# Patient Record
Sex: FEMALE | Race: WHITE | URBAN - METROPOLITAN AREA
[De-identification: names, ages, dates, MRNs, and addresses within clinical notes are randomized per-mention and may not be internally consistent; named-entity substitution may affect disease eponyms.]

---

## 2017-02-21 ENCOUNTER — OP HISTORICAL/CONVERTED ENCOUNTER (OUTPATIENT)
Dept: OTHER | Age: 82
End: 2017-02-21

## 2017-03-17 ENCOUNTER — IP HISTORICAL/CONVERTED ENCOUNTER (OUTPATIENT)
Dept: OTHER | Age: 82
End: 2017-03-17

## 2017-04-05 ENCOUNTER — OP HISTORICAL/CONVERTED ENCOUNTER (OUTPATIENT)
Dept: OTHER | Age: 82
End: 2017-04-05

## 2017-07-28 ENCOUNTER — OFFICE VISIT (OUTPATIENT)
Dept: ENDOCRINOLOGY | Age: 82
End: 2017-07-28

## 2017-07-28 VITALS
OXYGEN SATURATION: 99 % | RESPIRATION RATE: 16 BRPM | WEIGHT: 139.3 LBS | DIASTOLIC BLOOD PRESSURE: 54 MMHG | TEMPERATURE: 95.9 F | SYSTOLIC BLOOD PRESSURE: 152 MMHG | HEIGHT: 64 IN | HEART RATE: 75 BPM | BODY MASS INDEX: 23.78 KG/M2

## 2017-07-28 DIAGNOSIS — E03.4 HYPOTHYROIDISM DUE TO ACQUIRED ATROPHY OF THYROID: ICD-10-CM

## 2017-07-28 DIAGNOSIS — E11.65 TYPE 2 DIABETES MELLITUS WITH HYPERGLYCEMIA, WITH LONG-TERM CURRENT USE OF INSULIN (HCC): Primary | ICD-10-CM

## 2017-07-28 DIAGNOSIS — Z79.4 TYPE 2 DIABETES MELLITUS WITH HYPERGLYCEMIA, WITH LONG-TERM CURRENT USE OF INSULIN (HCC): Primary | ICD-10-CM

## 2017-07-28 DIAGNOSIS — I10 ESSENTIAL HYPERTENSION: ICD-10-CM

## 2017-07-28 RX ORDER — HYDROCHLOROTHIAZIDE 25 MG/1
25 TABLET ORAL DAILY
COMMUNITY
End: 2019-01-01

## 2017-07-28 RX ORDER — SIMVASTATIN 20 MG/1
TABLET, FILM COATED ORAL
COMMUNITY
End: 2019-01-01

## 2017-07-28 RX ORDER — MEMANTINE HYDROCHLORIDE 10 MG/1
TABLET ORAL 2 TIMES DAILY
COMMUNITY
End: 2019-01-01

## 2017-07-28 RX ORDER — SERTRALINE HYDROCHLORIDE 50 MG/1
TABLET, FILM COATED ORAL 2 TIMES DAILY
COMMUNITY

## 2017-07-28 RX ORDER — METFORMIN HYDROCHLORIDE 1000 MG/1
1000 TABLET ORAL 2 TIMES DAILY WITH MEALS
COMMUNITY
End: 2018-09-13

## 2017-07-28 RX ORDER — LEVOTHYROXINE SODIUM 50 UG/1
TABLET ORAL
COMMUNITY

## 2017-07-28 RX ORDER — ALPRAZOLAM 0.25 MG/1
TABLET ORAL
COMMUNITY
End: 2019-01-01

## 2017-07-28 RX ORDER — LISINOPRIL 10 MG/1
TABLET ORAL 2 TIMES DAILY
COMMUNITY

## 2017-07-28 RX ORDER — GLIPIZIDE 10 MG/1
10 TABLET ORAL 2 TIMES DAILY
COMMUNITY
End: 2017-10-30 | Stop reason: ALTCHOICE

## 2017-07-28 NOTE — PROGRESS NOTES
[facility]               Stasia Merlin ,MD        [address] 78 [phone] Fax [phone]               Patient Information  Date:7/30/2017  Name : Stephanie Taylor 80 y.o.     YOB: 1933         Referred by: Richard Graves NP        Chief Complaint   Patient presents with    New Patient     Diabetes       History of Present Illness: Stephanie Taylor is a 80 y.o. female here for initial visit of  Type 2 Diabetes Mellitus. She is here for management of type 2 diabetes mellitus. She was diagnosed with diabetes when she was in her 46s. She is on Glipizide, Levemir, Metformin. She lives alone, eats sometimes only one meal a day and munches all day. As far as oral medications, her family is helping her both in the morning and the evening. One of the family members come to make sure she takes her medications and checks blood glucose. She also checks the blood glucose intermittently. She is checking it sometimes before and sometimes right after the meals. No significant hypoglycemic episodes. She does say that she sometimes feels the blood sugar drops too quickly and she has to eat something. Weight has been stable. Wt Readings from Last 3 Encounters:   07/28/17 139 lb 4.8 oz (63.2 kg)       BP Readings from Last 3 Encounters:   07/28/17 152/54           Past Medical History:   Diagnosis Date    Alzheimer disease     Anxiety     COPD (chronic obstructive pulmonary disease) (HCC)     Depressive disorder     DM (diabetes mellitus) ([facility]     HTN (hypertension)     Hyperlipemia      Current Outpatient Prescriptions   Medication Sig    metFORMIN (GLUCOPHAGE) 1,000 mg tablet Take 1,000 mg by mouth two (2) times daily (with meals).  glipiZIDE (GLUCOTROL) 10 mg tablet Take 10 mg by mouth two (2) times a day.  levothyroxine (SYNTHROID) 50 mcg tablet Take  by mouth Daily (before breakfast).     sertraline (ZOLOFT) 50 mg tablet Take  by mouth two (2) times a day.  lisinopril (PRINIVIL, ZESTRIL) 10 mg tablet Take  by mouth two (2) times a day.  hydroCHLOROthiazide (HYDRODIURIL) 25 mg tablet Take 25 mg by mouth daily. As needed    memantine (NAMENDA) 10 mg tablet Take  by mouth two (2) times a day.  simvastatin (ZOCOR) 20 mg tablet Take  by mouth nightly.  ALPRAZolam (XANAX) 0.25 mg tablet Take  by mouth.  insulin detemir (LEVEMIR FLEXTOUCH) 100 unit/mL (3 mL) inpn Inject 14 units in the AM and 10 units at night. No current facility-administered medications for this visit. Allergies   Allergen Reactions    Aspirin Unknown (comments)    Codeine Unknown (comments)    Demerol [Meperidine] Unknown (comments)    Duricef [Cefadroxil] Unknown (comments)    Erythromycin Unknown (comments)    Lyrica [Pregabalin] Unknown (comments)    Morphine Unknown (comments)    Neurontin [Gabapentin] Rash and Swelling    Pcn [Penicillins] Rash and Swelling    Sulfa (Sulfonamide Antibiotics) Rash    Tetracyclines Rash    Toradol [Ketorolac] Unknown (comments)    Tramadol Unknown (comments)         Review of Systems:  - Constitutional Symptoms: no fevers, no chills, no weight loss  - Eyes: no blurry vision no double vision  - Cardiovascular: no chest pain ,no palpitations  - Respiratory: no cough no shortness of breath  - Gastrointestinal: no dysphagia no  abdominal pain  - Musculoskeletal: + joint pains  +  weakness  - Integumentary: no rashes  - Neurological: + numbness, tingling, no  headaches  - Psychiatric: no depression no  anxiety  - Endocrine: no heat or cold intolerance    Physical Examination:   Blood pressure 152/54, pulse 75, temperature 95.9 °F (35.5 °C), temperature source Oral, resp. rate 16, height 5' 4\" (1.626 m), weight 139 lb 4.8 oz (63.2 kg), SpO2 99 %. Estimated body mass index is 23.91 kg/(m^2) as calculated from the following:    Height as of this encounter: 5' 4\" (1.626 m).   -   Weight as of this encounter: 139 lb 4.8 oz (63.2 kg). - General: pleasant, no distress, good eye contact  - HEENT: no pallor, no periorbital edema, EOMI  - Neck: supple, no thyromegaly,   - Cardiovascular: regular, normal rate, normal S1 and S2,   - Respiratory: clear to auscultation bilaterally  - Gastrointestinal: soft, nontender, nondistended,  BS +  - Musculoskeletal: no proximal muscle weakness in upper or lower extremities  - Integumentary: no edema,  - Neurological:,alert and oriented  - Psychiatric: normal mood and affect  - Skin: color, texture, turgor normal.       Data Reviewed:     [] Glucose records reviewed. [] See glucose records for details (to be scanned). [] A1C  [] Reviewed labs      Assessment/Plan:     1. Type 2 diabetes mellitus with hyperglycemia, with long-term current use of insulin ([address]    2. Essential hypertension    3. Hypothyroidism due to acquired atrophy of thyroid        1. Type 2 Diabetes Mellitus with nephropathy,  No results found for: HBA1C, HGBE8, [id number], LWJ2YHRM, IKR9UKCA    Stop Glipizide  Check sugars before breakfast and before bedtime  Continue Metformin , will stop Metformin if     Levemir 14 units in AM and 10 units at night . If sugars are less than 100 at bedtime then do not take Levemir       2. HTN : Continue current therapy     3. Hyperlipidemia : Continue statin. 4. Hypothyroidism - on T4        Follow-up Disposition:  Return in about 3 months (around 10/28/2017). Thank you for allowing me to participate in the care of this patient.     Mauricio Westbrook MD      Patient verbalized understanding

## 2017-07-28 NOTE — MR AVS SNAPSHOT
Visit Information Date & Time Provider Department Dept. Phone Encounter #  
 7/28/2017 10:00 AM Soham Winkler MD [facility] [phone] [id number] Follow-up Instructions Return in about 3 months (around 10/28/2017). Upcoming Health Maintenance Date Due DTaP/Tdap/Td series (1 - Tdap) 1/24/1954 ZOSTER VACCINE AGE 60> 11/24/1992 GLAUCOMA SCREENING Q2Y 1/24/1998 OSTEOPOROSIS SCREENING (DEXA) 1/24/1998 Pneumococcal 65+ Low/Medium Risk (1 of 2 - PCV13) 1/24/1998 MEDICARE YEARLY EXAM 1/24/1998 INFLUENZA AGE 9 TO ADULT 8/1/2017 Allergies as of 7/28/2017  Review Complete On: 7/28/2017 By: Soham Winkler MD  
  
 Severity Noted Reaction Type Reactions Aspirin  07/28/2017    Unknown (comments) Codeine  07/28/2017    Unknown (comments) Demerol [Meperidine]  07/28/2017    Unknown (comments) Duricef [Cefadroxil]  07/28/2017    Unknown (comments) Erythromycin  07/28/2017    Unknown (comments) Lyrica [Pregabalin]  07/28/2017    Unknown (comments) Morphine  07/28/2017    Unknown (comments) Neurontin [Gabapentin]  07/28/2017    Rash, Swelling Pcn [Penicillins]  07/28/2017    Rash, Swelling Sulfa (Sulfonamide Antibiotics)  07/28/2017    Rash Tetracyclines  07/28/2017    Rash Toradol [Ketorolac]  07/28/2017    Unknown (comments) Tramadol  07/28/2017    Unknown (comments) Current Immunizations  Never Reviewed No immunizations on file. Not reviewed this visit Vitals BP Pulse Temp Resp Height(growth percentile) Weight(growth percentile) 152/54 (BP 1 Location: Left arm, BP Patient Position: Sitting) 75 95.9 °F (35.5 °C) (Oral) 16 5' 4\" (1.626 m) 139 lb 4.8 oz (63.2 kg) SpO2 BMI OB Status Smoking Status 99% 23.91 kg/m2 Hysterectomy Former Smoker Vitals History BMI and BSA Data  Body Mass Index Body Surface Area  
 23.91 kg/m 2 1.69 m 2  
  
  
 Your Updated Medication List  
  
   
This list is accurate as of: 7/28/17 10:43 AM.  Always use your most recent med list.  
  
  
  
  
 ALPRAZolam 0.25 mg tablet Commonly known as:  Fernando Darek Take  by mouth. glipiZIDE 10 mg tablet Commonly known as:  [place] Take 10 mg by mouth two (2) times a day. hydroCHLOROthiazide 25 mg tablet Commonly known as:  HYDRODIURIL Take 25 mg by mouth daily. As needed LEVEMIR FLEXTOUCH SC  
20 Units by SubCUTAneous route nightly. levothyroxine 50 mcg tablet Commonly known as:  SYNTHROID Take  by mouth Daily (before breakfast). lisinopril 10 mg tablet Commonly known as:  [facility] [place] Take  by mouth two (2) times a day. memantine 10 mg tablet Commonly known as:  [facility] [employer] Take  by mouth two (2) times a day. metFORMIN 1,000 mg tablet Commonly known as:  GLUCOPHAGE Take 1,000 mg by mouth two (2) times daily (with meals). sertraline 50 mg tablet Commonly known as:  ZOLOFT Take  by mouth two (2) times a day. simvastatin 20 mg tablet Commonly known as:  ZOCOR Take  by mouth nightly. Follow-up Instructions Return in about 3 months (around 10/28/2017). Patient Instructions Stop Glipizide Check sugars before breakfast and before bedtime Continue Metformin Levemir 14 units in AM and 10 units at night If sugars are less than 100 at bedtime then do not take Levemir Preventing Falls: Care Instructions Your Care Instructions Getting around your home safely can be a challenge if you have injuries or health problems that make it easy for you to fall. Loose rugs and furniture in walkways are among the dangers for many older people who have problems walking or who have poor eyesight. People who have conditions such as arthritis, osteoporosis, or dementia also have to be careful not to fall. You can make your home safer with a few simple measures. Follow-up care is a key part of your treatment and safety. Be sure to make and go to all appointments, and call your doctor if you are having problems. It's also a good idea to know your test results and keep a list of the medicines you take. How can you care for yourself at home? Taking care of yourself · You may get dizzy if you do not drink enough water. To prevent dehydration, drink plenty of fluids, enough so that your urine is light yellow or clear like water. Choose water and other caffeine-free clear liquids. If you have kidney, heart, or liver disease and have to limit fluids, talk with your doctor before you increase the amount of fluids you drink. · Exercise regularly to improve your strength, muscle tone, and balance. Walk if you can. Swimming may be a good choice if you cannot walk easily. · Have your vision and hearing checked each year or any time you notice a change. If you have trouble seeing and hearing, you might not be able to avoid objects and could lose your balance. · Know the side effects of the medicines you take. Ask your doctor or pharmacist whether the medicines you take can affect your balance. Sleeping pills or sedatives can affect your balance. · Limit the amount of alcohol you drink. Alcohol can impair your balance and other senses. · Ask your doctor whether calluses or corns on your feet need to be removed. If you wear loose-fitting shoes because of calluses or corns, you can lose your balance and fall. · Talk to your doctor if you have numbness in your feet. Preventing falls at home · Remove raised doorway thresholds, throw rugs, and clutter. Repair loose carpet or raised areas in the floor. · Move furniture and electrical cords to keep them out of walking paths. · Use nonskid floor wax, and wipe up spills right away, especially on ceramic tile floors. · If you use a walker or cane, put rubber tips on it.  If you use crutches, clean the bottoms of them regularly with an abrasive pad, such as steel wool. · Keep your house well lit, especially [facility], and outside walkways. Use night-lights in areas such as hallways and bathrooms. Add extra light switches or use remote switches (such as switches that go on or off when you clap your hands) to make it easier to turn lights on if you have to get up during the night. · Install sturdy handrails on stairways. · Move items in your cabinets so that the things you use a lot are on the lower shelves (about waist level). · Keep a cordless phone and a flashlight with new batteries by your bed. If possible, put a phone in each of the main rooms of your house, or carry a cell phone in case you fall and cannot reach a phone. Or, you can wear a device around your neck or wrist. You push a button that sends a signal for help. · Wear low-heeled shoes that fit well and give your feet good support. Use footwear with nonskid soles. Check the heels and soles of your shoes for wear. Repair or replace worn heels or soles. · Do not wear socks without shoes on wood floors. · Walk on the grass when the sidewalks are slippery. If you live in an area that gets snow and ice in the winter, sprinkle salt on slippery steps and sidewalks. Preventing falls in the bath · Install grab bars and nonskid mats inside and outside your shower or tub and near the toilet and sinks. · Use shower chairs and bath benches. · Use a hand-held shower head that will allow you to sit while showering. · Get into a tub or shower by putting the weaker leg in first. Get out of a tub or shower with your strong side first. 
· Repair loose toilet seats and consider installing a raised toilet seat to make getting on and off the toilet easier. · Keep your bathroom door unlocked while you are in the shower. Where can you learn more? Go to http://trevor-nisreen.info/. Enter 0476 [id number] in the search box to learn more about \"Preventing Falls: Care Instructions. \" Current as of: August 4, 2016 Content Version: 11.3 © 1219-7754 [employer], Broadcast.com. Care instructions adapted under license by [employer] (which disclaims liability or warranty for this information). If you have questions about a medical condition or this instruction, always ask your healthcare professional. [place] [address] any warranty or liability for your use of this information. Introducing [facility] & HE[facility]! Chantal De Anda introduces FriendsClear patient portal. Now you can access parts of your medical record, email your doctor's office, and request medication refills online. 1. In your internet browser, go to [email or web address] 2. Click on the First Time User? Click Here link in the Sign In box. You will see the New Member Sign Up page. 3. Enter your FriendsClear Access Code exactly as it appears below. You will not need to use this code after youve completed the sign-up process. If you do not sign up before the expiration date, you must request a new code. · FriendsClear Access Code: [email or web address] Expires: 10/26/2017 10:43 AM 
 
4. Enter the last four digits of your Social Security Number (xxxx) and Date of Birth (mm/dd/yyyy) as indicated and click Submit. You will be taken to the next sign-up page. 5. Create a FriendsClear ID. This will be your FriendsClear login ID and cannot be changed, so think of one that is secure and easy to remember. 6. Create a FriendsClear password. You can change your password at any time. 7. Enter your Password Reset Question and Answer. This can be used at a later time if you forget your password. 8. Enter your e-mail address. You will receive e-mail notification when new information is available in [address] 9. Click Sign Up. You can now view and download portions of your medical record. 10. Click the Download Summary menu link to download a portable copy of your medical information. If you have questions, please visit the Frequently Asked Questions section of the [employer] website. Remember, [employer] is NOT to be used for urgent needs. For medical emergencies, dial 911. Now available from your iPhone and Android! Please provide this summary of care documentation to your next provider. If you have any questions after today's visit, please call [phone].

## 2017-07-28 NOTE — PATIENT INSTRUCTIONS
Stop Glipizide    Check sugars before breakfast and before bedtime    Continue Metformin     Levemir 14 units in AM and 10 units at night     If sugars are less than 100 at bedtime then do not take Levemir          Preventing Falls: Care Instructions  Your Care Instructions  Getting around your home safely can be a challenge if you have injuries or health problems that make it easy for you to fall. Loose rugs and furniture in walkways are among the dangers for many older people who have problems walking or who have poor eyesight. People who have conditions such as arthritis, osteoporosis, or dementia also have to be careful not to fall. You can make your home safer with a few simple measures. Follow-up care is a key part of your treatment and safety. Be sure to make and go to all appointments, and call your doctor if you are having problems. It's also a good idea to know your test results and keep a list of the medicines you take. How can you care for yourself at home? Taking care of yourself  · You may get dizzy if you do not drink enough water. To prevent dehydration, drink plenty of fluids, enough so that your urine is light yellow or clear like water. Choose water and other caffeine-free clear liquids. If you have kidney, heart, or liver disease and have to limit fluids, talk with your doctor before you increase the amount of fluids you drink. · Exercise regularly to improve your strength, muscle tone, and balance. Walk if you can. Swimming may be a good choice if you cannot walk easily. · Have your vision and hearing checked each year or any time you notice a change. If you have trouble seeing and hearing, you might not be able to avoid objects and could lose your balance. · Know the side effects of the medicines you take. Ask your doctor or pharmacist whether the medicines you take can affect your balance. Sleeping pills or sedatives can affect your balance. · Limit the amount of alcohol you drink. Alcohol can impair your balance and other senses. · Ask your doctor whether calluses or corns on your feet need to be removed. If you wear loose-fitting shoes because of calluses or corns, you can lose your balance and fall. · Talk to your doctor if you have numbness in your feet. Preventing falls at home  · Remove raised doorway thresholds, throw rugs, and clutter. Repair loose carpet or raised areas in the floor. · Move furniture and electrical cords to keep them out of walking paths. · Use nonskid floor wax, and wipe up spills right away, especially on ceramic tile floors. · If you use a walker or cane, put rubber tips on it. If you use crutches, clean the bottoms of them regularly with an abrasive pad, such as steel wool. · Keep your house well lit, especially [facility], and outside walkways. Use night-lights in areas such as hallways and bathrooms. Add extra light switches or use remote switches (such as switches that go on or off when you clap your hands) to make it easier to turn lights on if you have to get up during the night. · Install sturdy handrails on stairways. · Move items in your cabinets so that the things you use a lot are on the lower shelves (about waist level). · Keep a cordless phone and a flashlight with new batteries by your bed. If possible, put a phone in each of the main rooms of your house, or carry a cell phone in case you fall and cannot reach a phone. Or, you can wear a device around your neck or wrist. You push a button that sends a signal for help. · Wear low-heeled shoes that fit well and give your feet good support. Use footwear with nonskid soles. Check the heels and soles of your shoes for wear. Repair or replace worn heels or soles. · Do not wear socks without shoes on wood floors. · Walk on the grass when the sidewalks are slippery. If you live in an area that gets snow and ice in the winter, sprinkle salt on slippery steps and sidewalks.   Preventing falls in the bath  · Install grab bars and nonskid mats inside and outside your shower or tub and near the toilet and sinks. · Use shower chairs and bath benches. · Use a hand-held shower head that will allow you to sit while showering. · Get into a tub or shower by putting the weaker leg in first. Get out of a tub or shower with your strong side first.  · Repair loose toilet seats and consider installing a raised toilet seat to make getting on and off the toilet easier. · Keep your bathroom door unlocked while you are in the shower. Where can you learn more? Go to http://trevor-nisreen.info/. Enter [id number] in the search box to learn more about \"Preventing Falls: Care Instructions. \"  Current as of: August 4, 2016  Content Version: 11.3  © 8272-7565 [employer]oding [employer], Cont[employer]nt.com. Care instructions adapted under license by [employer] (which disclaims liability or warranty for this information). If you have questions about a medical condition or this instruction, always ask your healthcare professional. [place] [address] any warranty or liability for your use of this information.

## 2017-07-28 NOTE — PROGRESS NOTES
Anish Enamorado is a 80 y.o. female here for   Chief Complaint   Patient presents with    New Patient     Diabetes       Functional glucose monitor and record keeping system? - yes  Eye exam within last year? - yes  Foot exam within last year? - yes    1. Have you been to the ER, urgent care clinic since your last visit? Hospitalized since your last visit? - [facility] for dehydration in March 2017    2. Have you seen or consulted any other health care providers outside of the [address] [place] [address] since your last visit?   Include any pap smears or colon screening.-      No results found for: HBA1C, HGBE8, [id number], [id number]    Wt Readings from Last 3 Encounters:   07/28/17 139 lb 4.8 oz (63.2 kg)     Temp Readings from Last 3 Encounters:   07/28/17 95.9 °F (35.5 °C) (Oral)     BP Readings from Last 3 Encounters:   07/28/17 152/54     Pulse Readings from Last 3 Encounters:   07/28/17 75

## 2017-07-30 PROBLEM — I10 ESSENTIAL HYPERTENSION: Status: ACTIVE | Noted: 2017-07-30

## 2017-07-30 PROBLEM — E03.4 HYPOTHYROIDISM DUE TO ACQUIRED ATROPHY OF THYROID: Status: ACTIVE | Noted: 2017-07-30

## 2017-07-30 PROBLEM — Z79.4 TYPE 2 DIABETES MELLITUS WITH HYPERGLYCEMIA, WITH LONG-TERM CURRENT USE OF INSULIN (HCC): Status: ACTIVE | Noted: 2017-07-30

## 2017-07-30 PROBLEM — E11.65 TYPE 2 DIABETES MELLITUS WITH HYPERGLYCEMIA, WITH LONG-TERM CURRENT USE OF INSULIN (HCC): Status: ACTIVE | Noted: 2017-07-30

## 2017-10-03 ENCOUNTER — OP HISTORICAL/CONVERTED ENCOUNTER (OUTPATIENT)
Dept: OTHER | Age: 82
End: 2017-10-03

## 2017-10-30 ENCOUNTER — OFFICE VISIT (OUTPATIENT)
Dept: ENDOCRINOLOGY | Age: 82
End: 2017-10-30

## 2017-10-30 VITALS
RESPIRATION RATE: 18 BRPM | HEART RATE: 69 BPM | BODY MASS INDEX: 21.51 KG/M2 | SYSTOLIC BLOOD PRESSURE: 132 MMHG | WEIGHT: 126 LBS | HEIGHT: 64 IN | DIASTOLIC BLOOD PRESSURE: 57 MMHG | TEMPERATURE: 96.3 F | OXYGEN SATURATION: 98 %

## 2017-10-30 DIAGNOSIS — E11.65 TYPE 2 DIABETES MELLITUS WITH HYPERGLYCEMIA, WITH LONG-TERM CURRENT USE OF INSULIN (HCC): ICD-10-CM

## 2017-10-30 DIAGNOSIS — I10 ESSENTIAL HYPERTENSION: ICD-10-CM

## 2017-10-30 DIAGNOSIS — E03.4 HYPOTHYROIDISM DUE TO ACQUIRED ATROPHY OF THYROID: ICD-10-CM

## 2017-10-30 DIAGNOSIS — Z79.4 TYPE 2 DIABETES MELLITUS WITH HYPERGLYCEMIA, WITH LONG-TERM CURRENT USE OF INSULIN (HCC): Primary | ICD-10-CM

## 2017-10-30 DIAGNOSIS — E11.65 TYPE 2 DIABETES MELLITUS WITH HYPERGLYCEMIA, WITH LONG-TERM CURRENT USE OF INSULIN (HCC): Primary | ICD-10-CM

## 2017-10-30 DIAGNOSIS — Z79.4 TYPE 2 DIABETES MELLITUS WITH HYPERGLYCEMIA, WITH LONG-TERM CURRENT USE OF INSULIN (HCC): ICD-10-CM

## 2017-10-30 LAB
GLUCOSE POC: 166 MG/DL
HBA1C MFR BLD HPLC: 6.9 %

## 2017-10-30 NOTE — PROGRESS NOTES
[facility]               Karina Rodriguez MD        [address] 78 444 81 [phone] Fax [phone]               Patient Information  Date:10/30/2017  Name : Roel Hess 80 y.o.     YOB: 1933         Referred by: Patti Bence, NP        Chief Complaint   Patient presents with    Diabetes    Thyroid Problem       History of Present Illness: Roel Hess is a 80 y.o. female here for fu of  Type 2 Diabetes Mellitus. She is here for management of type 2 diabetes mellitus. She was diagnosed with diabetes when she was in her 46s. She lives alone, eats sometimes only one meal a day and munches all day. As far as oral medications, her family is helping her both in the morning and the evening. One of the family members come to make sure she takes her medications and checks blood glucose. Reviewed the log - no hypos   Missing insulin and then BG is > 200     Lost weight           Wt Readings from Last 3 Encounters:   10/30/17 126 lb (57.2 kg)   07/28/17 139 lb 4.8 oz (63.2 kg)       BP Readings from Last 3 Encounters:   10/30/17 132/57   07/28/17 152/54           Past Medical History:   Diagnosis Date    Alzheimer disease     Anxiety     COPD (chronic obstructive pulmonary disease) (HCC)     Depressive disorder     DM (diabetes mellitus) ([facility]     HTN (hypertension)     Hyperlipemia      Current Outpatient Prescriptions   Medication Sig    metFORMIN (GLUCOPHAGE) 1,000 mg tablet Take 1,000 mg by mouth two (2) times daily (with meals).  glipiZIDE (GLUCOTROL) 10 mg tablet Take 10 mg by mouth two (2) times a day.  levothyroxine (SYNTHROID) 50 mcg tablet Take  by mouth Daily (before breakfast).  sertraline (ZOLOFT) 50 mg tablet Take  by mouth two (2) times a day.  lisinopril (PRINIVIL, ZESTRIL) 10 mg tablet Take  by mouth two (2) times a day.  hydroCHLOROthiazide (HYDRODIURIL) 25 mg tablet Take 25 mg by mouth daily.  As needed    memantine (NAMENDA) 10 mg tablet Take  by mouth two (2) times a day.  simvastatin (ZOCOR) 20 mg tablet Take  by mouth nightly.  ALPRAZolam (XANAX) 0.25 mg tablet Take  by mouth.  insulin detemir (LEVEMIR FLEXTOUCH) 100 unit/mL (3 mL) inpn Inject 14 units in the AM and 10 units at night. No current facility-administered medications for this visit. Allergies   Allergen Reactions    Aspirin Unknown (comments)    Codeine Unknown (comments)    Demerol [Meperidine] Unknown (comments)    Duricef [Cefadroxil] Unknown (comments)    Erythromycin Unknown (comments)    Lyrica [Pregabalin] Unknown (comments)    Morphine Unknown (comments)    Neurontin [Gabapentin] Rash and Swelling    Pcn [Penicillins] Rash and Swelling    Sulfa (Sulfonamide Antibiotics) Rash    Tetracyclines Rash    Toradol [Ketorolac] Unknown (comments)    Tramadol Unknown (comments)         Review of Systems:  - Constitutional Symptoms: no fevers, no chills,  - Eyes: no blurry vision no double vision  - Cardiovascular: no chest pain ,no palpitations  - Respiratory: no cough no shortness of breath  - Gastrointestinal: no dysphagia no  abdominal pain  - Musculoskeletal: + joint pains  +  weakness  - Integumentary: no rashes  - Neurological: + numbness, tingling, no  headaches  - Psychiatric: no depression no  anxiety  - Endocrine: no heat or cold intolerance    Physical Examination:   Blood pressure 132/57, pulse 69, temperature 96.3 °F (35.7 °C), temperature source Oral, resp. rate 18, height 5' 4\" (1.626 m), weight 126 lb (57.2 kg), SpO2 98 %. Estimated body mass index is 21.63 kg/(m^2) as calculated from the following:    Height as of this encounter: 5' 4\" (1.626 m). -   Weight as of this encounter: 126 lb (57.2 kg).   - General: pleasant, no distress, good eye contact  - HEENT: no pallor, no periorbital edema, EOMI  - Neck: supple, no thyromegaly,   - Cardiovascular: regular, normal rate, normal S1 and S2, - Respiratory: clear to auscultation bilaterally  - Gastrointestinal: soft, nontender, nondistended,  BS +  - Musculoskeletal: no proximal muscle weakness in upper or lower extremities  - Integumentary: no edema,  - Neurological:,alert and oriented  - Psychiatric: normal mood and affect  - Skin: color, texture, turgor normal.       Data Reviewed:     [] Glucose records reviewed. [] See glucose records for details (to be scanned). [] A1C  [] Reviewed labs      Assessment/Plan:     1. Type 2 diabetes mellitus with hyperglycemia, with long-term current use of insulin ([facility]    2. Hypothyroidism due to acquired atrophy of thyroid    3. Essential hypertension        1. Type 2 Diabetes Mellitus with nephropathy,  Lab Results   Component Value Date/Time    Hemoglobin A1c (POC) 6.9 10/30/2017 10:27 AM         Check sugars before breakfast and before bedtime  Continue Metformin , will stop Metformin if  GFR is < 30     Levemir 8 units in AM and 8 units at night to start and then increase to 10 unist BID  . If sugars are less than 100 at bedtime then do not take Levemir    Glucerna when not eating     2. HTN : Continue current therapy     3. Hyperlipidemia : Continue statin. 4. Hypothyroidism - on T4    Family member with patient     Follow-up Disposition: Not on File    Thank you for allowing me to participate in the care of this patient.     Yola Lockhart MD      Patient verbalized understanding

## 2017-10-30 NOTE — MR AVS SNAPSHOT
Visit Information Date & Time Provider Department Dept. Phone Encounter #  
 10/30/2017 10:00 AM Will Wallace MD [facility] [phone] [id number] Upcoming Health Maintenance Date Due HEMOGLOBIN A1C Q6M 1/24/1933 LIPID PANEL Q1 1/24/1933 FOOT EXAM Q1 1/24/1943 MICROALBUMIN Q1 1/24/1943 EYE EXAM RETINAL OR DILATED Q1 1/24/1943 DTaP/Tdap/Td series (1 - Tdap) 1/24/1954 ZOSTER VACCINE AGE 60> 11/24/1992 GLAUCOMA SCREENING Q2Y 1/24/1998 OSTEOPOROSIS SCREENING (DEXA) 1/24/1998 Pneumococcal 65+ Low/Medium Risk (1 of 2 - PCV13) 1/24/1998 MEDICARE YEARLY EXAM 1/24/1998 INFLUENZA AGE 9 TO ADULT 8/1/2017 Allergies as of 10/30/2017  Review Complete On: 10/30/2017 By: Will Wallace MD  
  
 Severity Noted Reaction Type Reactions Aspirin  07/28/2017    Unknown (comments) Codeine  07/28/2017    Unknown (comments) Demerol [Meperidine]  07/28/2017    Unknown (comments) Duricef [Cefadroxil]  07/28/2017    Unknown (comments) Erythromycin  07/28/2017    Unknown (comments) Lyrica [Pregabalin]  07/28/2017    Unknown (comments) Morphine  07/28/2017    Unknown (comments) Neurontin [Gabapentin]  07/28/2017    Rash, Swelling Pcn [Penicillins]  07/28/2017    Rash, Swelling Sulfa (Sulfonamide Antibiotics)  07/28/2017    Rash Tetracyclines  07/28/2017    Rash Toradol [Ketorolac]  07/28/2017    Unknown (comments) Tramadol  07/28/2017    Unknown (comments) Current Immunizations  Never Reviewed No immunizations on file. Not reviewed this visit You Were Diagnosed With   
  
 Codes Comments Type 2 diabetes mellitus with hyperglycemia, with long-term current use of insulin (HCC)    -  Primary ICD-10-CM: E11.65, Z79.4 ICD-9-CM: 250.00, 790.29, V58.67 Hypothyroidism due to acquired atrophy of thyroid     ICD-10-CM: E03.4 ICD-9-CM: 244.8, 246.8  Essential hypertension     ICD-10-CM: I10 
 ICD-9-CM: 401.9 Vitals BP Pulse Temp Resp Height(growth percentile) Weight(growth percentile) 132/57 (BP 1 Location: Left arm, BP Patient Position: Sitting) 69 96.3 °F (35.7 °C) (Oral) 18 5' 4\" (1.626 m) 126 lb (57.2 kg) SpO2 BMI OB Status Smoking Status 98% 21.63 kg/m2 Hysterectomy Former Smoker Vitals History BMI and BSA Data Body Mass Index Body Surface Area  
 21.63 kg/m 2 1.61 m 2 Preferred Pharmacy Pharmacy Name Phone [id number] [address] AT [facility] [address] [phone] Your Updated Medication List  
  
   
This list is accurate as of: 10/30/17 10:46 AM.  Always use your most recent med list.  
  
  
  
  
 ALPRAZolam 0.25 mg tablet Commonly known as:  Mosetta Harp Take  by mouth. glipiZIDE 10 mg tablet Commonly known as:  Rhea Bonds Take 10 mg by mouth two (2) times a day. hydroCHLOROthiazide 25 mg tablet Commonly known as:  HYDRODIURIL Take 25 mg by mouth daily. As needed  
  
 insulin detemir 100 unit/mL (3 mL) Inpn Commonly known as:  Vernia Peralta Inject 14 units in the AM and 10 units at night. levothyroxine 50 mcg tablet Commonly known as:  SYNTHROID Take  by mouth Daily (before breakfast). lisinopril 10 mg tablet Commonly known as:  Nora Combs Take  by mouth two (2) times a day. memantine 10 mg tablet Commonly known as:  Maddi Cabezas Take  by mouth two (2) times a day. metFORMIN 1,000 mg tablet Commonly known as:  GLUCOPHAGE Take 1,000 mg by mouth two (2) times daily (with meals). sertraline 50 mg tablet Commonly known as:  ZOLOFT Take  by mouth two (2) times a day. simvastatin 20 mg tablet Commonly known as:  ZOCOR Take  by mouth nightly. We Performed the Following AMB POC GLUCOSE, QUANTITATIVE, BLOOD [47587 CPT(R)] AMB POC HEMOGLOBIN A1C [83519 CPT(R)] Patient Instructions Stop Glipizide Check sugars before breakfast and before bedtime Continue Metformin Levemir 10 units in AM and 10 units at night If sugars are less than 100  then do not take Levemir Preventing Falls: Care Instructions Your Care Instructions Getting around your home safely can be a challenge if you have injuries or health problems that make it easy for you to fall. Loose rugs and furniture in walkways are among the dangers for many older people who have problems walking or who have poor eyesight. People who have conditions such as arthritis, osteoporosis, or dementia also have to be careful not to fall. You can make your home safer with a few simple measures. Follow-up care is a key part of your treatment and safety. Be sure to make and go to all appointments, and call your doctor if you are having problems. It's also a good idea to know your test results and keep a list of the medicines you take. How can you care for yourself at home? Taking care of yourself · You may get dizzy if you do not drink enough water. To prevent dehydration, drink plenty of fluids, enough so that your urine is light yellow or clear like water. Choose water and other caffeine-free clear liquids. If you have kidney, heart, or liver disease and have to limit fluids, talk with your doctor before you increase the amount of fluids you drink. · Exercise regularly to improve your strength, muscle tone, and balance. Walk if you can. Swimming may be a good choice if you cannot walk easily. · Have your vision and hearing checked each year or any time you notice a change. If you have trouble seeing and hearing, you might not be able to avoid objects and could lose your balance. · Know the side effects of the medicines you take. Ask your doctor or pharmacist whether the medicines you take can affect your balance. Sleeping pills or sedatives can affect your balance. · Limit the amount of alcohol you drink. Alcohol can impair your balance and other senses. · Ask your doctor whether calluses or corns on your feet need to be removed. If you wear loose-fitting shoes because of calluses or corns, you can lose your balance and fall. · Talk to your doctor if you have numbness in your feet. Preventing falls at home · Remove raised doorway thresholds, throw rugs, and clutter. Repair loose carpet or raised areas in the floor. · Move furniture and electrical cords to keep them out of walking paths. · Use nonskid floor wax, and wipe up spills right away, especially on ceramic tile floors. · If you use a walker or cane, put rubber tips on it. If you use crutches, clean the bottoms of them regularly with an abrasive pad, such as steel wool. · Keep your house well lit, especially [place], and outside walkways. Use night-lights in areas such as hallways and bathrooms. Add extra light switches or use remote switches (such as switches that go on or off when you clap your hands) to make it easier to turn lights on if you have to get up during the night. · Install sturdy handrails on stairways. · Move items in your cabinets so that the things you use a lot are on the lower shelves (about waist level). · Keep a cordless phone and a flashlight with new batteries by your bed. If possible, put a phone in each of the main rooms of your house, or carry a cell phone in case you fall and cannot reach a phone. Or, you can wear a device around your neck or wrist. You push a button that sends a signal for help. · Wear low-heeled shoes that fit well and give your feet good support. Use footwear with nonskid soles. Check the heels and soles of your shoes for wear. Repair or replace worn heels or soles. · Do not wear socks without shoes on wood floors. · Walk on the grass when the sidewalks are slippery.  If you live in an area that gets snow and ice in the winter, sprinkle salt on slippery steps and sidewalks. Preventing falls in the bath · Install grab bars and nonskid mats inside and outside your shower or tub and near the toilet and sinks. · Use shower chairs and bath benches. · Use a hand-held shower head that will allow you to sit while showering. · Get into a tub or shower by putting the weaker leg in first. Get out of a tub or shower with your strong side first. 
· Repair loose toilet seats and consider installing a raised toilet seat to make getting on and off the toilet easier. · Keep your bathroom door unlocked while you are in the shower. Where can you learn more? Go to http://trevor-nisreen.info/. Enter [id number] [id number] [phone] in the search box to learn more about \"Preventing Falls: Care Instructions. \" Current as of: August 4, 2016 Content Version: 11.3 © 7107-5701 [employer]. Care instructions adapted under license by [employer] (which disclaims liability or warranty for this information). If you have questions about a medical condition or this instruction, always ask your healthcare professional. [place] [address] any warranty or liability for your use of this information. Introducing [facility]! Romayne Duster introduces [employer] patient portal. Now you can access parts of your medical record, email your doctor's office, and request medication refills online. 1. In your internet browser, go to [email or web address] 2. Click on the First Time User? Click Here link in the Sign In box. You will see the New Member Sign Up page. 3. Enter your [employer] Access Code exactly as it appears below. You will not need to use this code after youve completed the sign-up process. If you do not sign up before the expiration date, you must request a new code. · [employer] Access Code: [email or web address] Expires: 1/28/2018 10:46 AM 
 
 4. Enter the last four digits of your Social Security Number (xxxx) and Date of Birth (mm/dd/yyyy) as indicated and click Submit. You will be taken to the next sign-up page. 5. Create a In[employer] Software ID. This will be your [employer] login ID and cannot be changed, so think of one that is secure and easy to remember. 6. Create a [employer] password. You can change your password at any time. 7. Enter your Password Reset Question and Answer. This can be used at a later time if you forget your password. 8. Enter your e-mail address. You will receive e-mail notification when new information is available in [address] 9. Click Sign Up. You can now view and download portions of your medical record. 10. Click the Download Summary menu link to download a portable copy of your medical information. If you have questions, please visit the Frequently Asked Questions section of the [employer] website. Remember, [employer] Software is NOT to be used for urgent needs. For medical emergencies, dial 911. Now available from your iPhone and Android! Please provide this summary of care documentation to your next provider. If you have any questions after today's visit, please call [phone].

## 2017-10-30 NOTE — PATIENT INSTRUCTIONS
Stop Glipizide    Check sugars before breakfast and before bedtime    Continue Metformin     Levemir 10 units in AM and 10 units at night     If sugars are less than 100  then do not take Levemir          Preventing Falls: Care Instructions  Your Care Instructions  Getting around your home safely can be a challenge if you have injuries or health problems that make it easy for you to fall. Loose rugs and furniture in walkways are among the dangers for many older people who have problems walking or who have poor eyesight. People who have conditions such as arthritis, osteoporosis, or dementia also have to be careful not to fall. You can make your home safer with a few simple measures. Follow-up care is a key part of your treatment and safety. Be sure to make and go to all appointments, and call your doctor if you are having problems. It's also a good idea to know your test results and keep a list of the medicines you take. How can you care for yourself at home? Taking care of yourself  · You may get dizzy if you do not drink enough water. To prevent dehydration, drink plenty of fluids, enough so that your urine is light yellow or clear like water. Choose water and other caffeine-free clear liquids. If you have kidney, heart, or liver disease and have to limit fluids, talk with your doctor before you increase the amount of fluids you drink. · Exercise regularly to improve your strength, muscle tone, and balance. Walk if you can. Swimming may be a good choice if you cannot walk easily. · Have your vision and hearing checked each year or any time you notice a change. If you have trouble seeing and hearing, you might not be able to avoid objects and could lose your balance. · Know the side effects of the medicines you take. Ask your doctor or pharmacist whether the medicines you take can affect your balance. Sleeping pills or sedatives can affect your balance. · Limit the amount of alcohol you drink.  Alcohol can impair your balance and other senses. · Ask your doctor whether calluses or corns on your feet need to be removed. If you wear loose-fitting shoes because of calluses or corns, you can lose your balance and fall. · Talk to your doctor if you have numbness in your feet. Preventing falls at home  · Remove raised doorway thresholds, throw rugs, and clutter. Repair loose carpet or raised areas in the floor. · Move furniture and electrical cords to keep them out of walking paths. · Use nonskid floor wax, and wipe up spills right away, especially on ceramic tile floors. · If you use a walker or cane, put rubber tips on it. If you use crutches, clean the bottoms of them regularly with an abrasive pad, such as steel wool. · Keep your house well lit, especially Cord Gabriel, and outside walkways. Use night-lights in areas such as hallways and bathrooms. Add extra light switches or use remote switches (such as switches that go on or off when you clap your hands) to make it easier to turn lights on if you have to get up during the night. · Install sturdy handrails on stairways. · Move items in your cabinets so that the things you use a lot are on the lower shelves (about waist level). · Keep a cordless phone and a flashlight with new batteries by your bed. If possible, put a phone in each of the main rooms of your house, or carry a cell phone in case you fall and cannot reach a phone. Or, you can wear a device around your neck or wrist. You push a button that sends a signal for help. · Wear low-heeled shoes that fit well and give your feet good support. Use footwear with nonskid soles. Check the heels and soles of your shoes for wear. Repair or replace worn heels or soles. · Do not wear socks without shoes on wood floors. · Walk on the grass when the sidewalks are slippery. If you live in an area that gets snow and ice in the winter, sprinkle salt on slippery steps and sidewalks.   Preventing falls in the bath  · Install grab bars and nonskid mats inside and outside your shower or tub and near the toilet and sinks. · Use shower chairs and bath benches. · Use a hand-held shower head that will allow you to sit while showering. · Get into a tub or shower by putting the weaker leg in first. Get out of a tub or shower with your strong side first.  · Repair loose toilet seats and consider installing a raised toilet seat to make getting on and off the toilet easier. · Keep your bathroom door unlocked while you are in the shower. Where can you learn more? Go to http://trevor-nisreen.info/. Enter [phone] [id number] [phone] in the search box to learn more about \"Preventing Falls: Care Instructions. \"  Current as of: August 4, 2016  Content Version: 11.3  © 9906-2895 [employer]. Care instructions adapted under license by [employer] (which disclaims liability or warranty for this information). If you have questions about a medical condition or this instruction, always ask your healthcare professional. [place] [address] any warranty or liability for your use of this information.

## 2017-10-30 NOTE — PROGRESS NOTES
Myrna Sewell is a 80 y.o. female here for   Chief Complaint   Patient presents with    Diabetes    Thyroid Problem       Functional glucose monitor and record keeping system? - yes  Eye exam within last year? - no  Foot exam within last year? - yes    1. Have you been to the ER, urgent care clinic since your last visit? Hospitalized since your last visit? -no    2. Have you seen or consulted any other health care providers outside of the [address] [place] [address] since your last visit? Include any pap smears or colon screening. -PCP      No results found for: HBA1C, HGBE8, KMV1IVUE, SVR6PITA    Wt Readings from Last 3 Encounters:   07/28/17 139 lb 4.8 oz (63.2 kg)     Temp Readings from Last 3 Encounters:   07/28/17 95.9 °F (35.5 °C) (Oral)     BP Readings from Last 3 Encounters:   07/28/17 152/54     Pulse Readings from Last 3 Encounters:   07/28/17 75

## 2017-11-08 ENCOUNTER — TELEPHONE (OUTPATIENT)
Dept: ENDOCRINOLOGY | Age: 82
End: 2017-11-08

## 2017-11-08 NOTE — TELEPHONE ENCOUNTER
Spoke with patient's daughter and gave her insulin changes per . She verbalized understanding. Informed her to call back if she continues to have low blood sugars. She verbalized understanding.

## 2017-11-08 NOTE — TELEPHONE ENCOUNTER
Spoke with patient's daughter (HIPAA verified) and she stated the patient's blood sugar has begin to get lower in the mornings. Blood sugars are as follows:  11/5   11/06   11/07   11/08 BB 55. This morning patient woke up shaking and did not seem well. Patient is taking Lantus 10 units in the morning and 10 units at night. Please advise.

## 2018-03-01 ENCOUNTER — HOSPITAL ENCOUNTER (OUTPATIENT)
Dept: LAB | Age: 83
Discharge: HOME | End: 2018-03-01
Payer: MEDICARE

## 2018-03-01 ENCOUNTER — OFFICE VISIT (OUTPATIENT)
Dept: ENDOCRINOLOGY | Age: 83
End: 2018-03-01

## 2018-03-01 VITALS
OXYGEN SATURATION: 95 % | HEART RATE: 74 BPM | HEIGHT: 64 IN | WEIGHT: 128.7 LBS | BODY MASS INDEX: 21.97 KG/M2 | SYSTOLIC BLOOD PRESSURE: 123 MMHG | RESPIRATION RATE: 16 BRPM | DIASTOLIC BLOOD PRESSURE: 77 MMHG | TEMPERATURE: 96.5 F

## 2018-03-01 DIAGNOSIS — Z79.4 TYPE 2 DIABETES MELLITUS WITH HYPERGLYCEMIA, WITH LONG-TERM CURRENT USE OF INSULIN (HCC): Primary | ICD-10-CM

## 2018-03-01 DIAGNOSIS — E03.4 HYPOTHYROIDISM DUE TO ACQUIRED ATROPHY OF THYROID: ICD-10-CM

## 2018-03-01 DIAGNOSIS — Z79.4 TYPE 2 DIABETES MELLITUS WITH HYPERGLYCEMIA, WITH LONG-TERM CURRENT USE OF INSULIN (HCC): ICD-10-CM

## 2018-03-01 DIAGNOSIS — E11.65 TYPE 2 DIABETES MELLITUS WITH HYPERGLYCEMIA, WITH LONG-TERM CURRENT USE OF INSULIN (HCC): Primary | ICD-10-CM

## 2018-03-01 DIAGNOSIS — E11.65 TYPE 2 DIABETES MELLITUS WITH HYPERGLYCEMIA, WITH LONG-TERM CURRENT USE OF INSULIN (HCC): ICD-10-CM

## 2018-03-01 DIAGNOSIS — I10 ESSENTIAL HYPERTENSION: ICD-10-CM

## 2018-03-01 LAB
GLUCOSE POC: 186 MG/DL
HBA1C MFR BLD HPLC: 6.6 %

## 2018-03-01 PROCEDURE — 36415 COLL VENOUS BLD VENIPUNCTURE: CPT

## 2018-03-01 PROCEDURE — 82043 UR ALBUMIN QUANTITATIVE: CPT

## 2018-03-01 PROCEDURE — 83721 ASSAY OF BLOOD LIPOPROTEIN: CPT

## 2018-03-01 PROCEDURE — 80053 COMPREHEN METABOLIC PANEL: CPT

## 2018-03-01 RX ORDER — LINAGLIPTIN 5 MG/1
TABLET, FILM COATED ORAL
Refills: 1 | COMMUNITY
Start: 2018-02-21 | End: 2019-01-01

## 2018-03-01 RX ORDER — AMLODIPINE BESYLATE 10 MG/1
TABLET ORAL
Refills: 1 | COMMUNITY
Start: 2018-02-01

## 2018-03-01 RX ORDER — CHOLECALCIFEROL (VITAMIN D3) 125 MCG
50000 CAPSULE ORAL
Refills: 1 | COMMUNITY
Start: 2017-12-14

## 2018-03-01 RX ORDER — POTASSIUM CHLORIDE 1500 MG/1
TABLET, EXTENDED RELEASE ORAL
Refills: 1 | COMMUNITY
Start: 2018-02-21

## 2018-03-01 RX ORDER — LANOLIN ALCOHOL/MO/W.PET/CERES
CREAM (GRAM) TOPICAL
Refills: 1 | COMMUNITY
Start: 2018-02-21

## 2018-03-01 RX ORDER — LOSARTAN POTASSIUM 50 MG/1
TABLET ORAL
Refills: 1 | COMMUNITY
Start: 2018-02-21

## 2018-03-01 RX ORDER — PEN NEEDLE, DIABETIC 31 GX3/16"
NEEDLE, DISPOSABLE MISCELLANEOUS
Qty: 200 PEN NEEDLE | Refills: 11 | Status: SHIPPED | OUTPATIENT
Start: 2018-03-01

## 2018-03-01 RX ORDER — SERTRALINE HYDROCHLORIDE 100 MG/1
TABLET, FILM COATED ORAL
COMMUNITY
Start: 2018-02-23 | End: 2019-01-01

## 2018-03-01 RX ORDER — RISPERIDONE 0.5 MG/1
TABLET, ORALLY DISINTEGRATING ORAL
Refills: 3 | COMMUNITY
Start: 2018-02-02

## 2018-03-01 NOTE — PROGRESS NOTES
Luanne Spurling is a 80 y.o. female here for   Chief Complaint   Patient presents with    Diabetes    Thyroid Problem       Functional glucose monitor and record keeping system? - yes  Eye exam within last year? - no, need referral  Foot exam within last year? - due    1. Have you been to the ER, urgent care clinic since your last visit? Hospitalized since your last visit? [facility] couple of months ago for med adjustment    2. Have you seen or consulted any other health care providers outside of the [place] [address] since your last visit?   Include any pap smears or colon screening.-Sara Maya, and Neurology Dr. Sara Hoyt      Lab Results   Component Value Date/Time    Hemoglobin A1c (POC) 6.9 10/30/2017 10:27 AM       Wt Readings from Last 3 Encounters:   10/30/17 126 lb (57.2 kg)   07/28/17 139 lb 4.8 oz (63.2 kg)     Temp Readings from Last 3 Encounters:   10/30/17 96.3 °F (35.7 °C) (Oral)   07/28/17 95.9 °F (35.5 °C) (Oral)     BP Readings from Last 3 Encounters:   10/30/17 132/57   07/28/17 152/54     Pulse Readings from Last 3 Encounters:   10/30/17 69   07/28/17 75

## 2018-03-01 NOTE — PATIENT INSTRUCTIONS
Check sugars before breakfast and before bedtime    Continue Metformin     Levemir 10 units in AM and 6 units at night     If sugars are less than 100  then do not take Levemir          Preventing Falls: Care Instructions  Your Care Instructions  Getting around your home safely can be a challenge if you have injuries or health problems that make it easy for you to fall. Loose rugs and furniture in walkways are among the dangers for many older people who have problems walking or who have poor eyesight. People who have conditions such as arthritis, osteoporosis, or dementia also have to be careful not to fall. You can make your home safer with a few simple measures. Follow-up care is a key part of your treatment and safety. Be sure to make and go to all appointments, and call your doctor if you are having problems. It's also a good idea to know your test results and keep a list of the medicines you take. How can you care for yourself at home? Taking care of yourself  · You may get dizzy if you do not drink enough water. To prevent dehydration, drink plenty of fluids, enough so that your urine is light yellow or clear like water. Choose water and other caffeine-free clear liquids. If you have kidney, heart, or liver disease and have to limit fluids, talk with your doctor before you increase the amount of fluids you drink. · Exercise regularly to improve your strength, muscle tone, and balance. Walk if you can. Swimming may be a good choice if you cannot walk easily. · Have your vision and hearing checked each year or any time you notice a change. If you have trouble seeing and hearing, you might not be able to avoid objects and could lose your balance. · Know the side effects of the medicines you take. Ask your doctor or pharmacist whether the medicines you take can affect your balance. Sleeping pills or sedatives can affect your balance. · Limit the amount of alcohol you drink.  Alcohol can impair your balance and other senses. · Ask your doctor whether calluses or corns on your feet need to be removed. If you wear loose-fitting shoes because of calluses or corns, you can lose your balance and fall. · Talk to your doctor if you have numbness in your feet. Preventing falls at home  · Remove raised doorway thresholds, throw rugs, and clutter. Repair loose carpet or raised areas in the floor. · Move furniture and electrical cords to keep them out of walking paths. · Use nonskid floor wax, and wipe up spills right away, especially on ceramic tile floors. · If you use a walker or cane, put rubber tips on it. If you use crutches, clean the bottoms of them regularly with an abrasive pad, such as steel wool. · Keep your house well lit, especially Christi Duck, and outside walkways. Use night-lights in areas such as hallways and bathrooms. Add extra light switches or use remote switches (such as switches that go on or off when you clap your hands) to make it easier to turn lights on if you have to get up during the night. · Install sturdy handrails on stairways. · Move items in your cabinets so that the things you use a lot are on the lower shelves (about waist level). · Keep a cordless phone and a flashlight with new batteries by your bed. If possible, put a phone in each of the main rooms of your house, or carry a cell phone in case you fall and cannot reach a phone. Or, you can wear a device around your neck or wrist. You push a button that sends a signal for help. · Wear low-heeled shoes that fit well and give your feet good support. Use footwear with nonskid soles. Check the heels and soles of your shoes for wear. Repair or replace worn heels or soles. · Do not wear socks without shoes on wood floors. · Walk on the grass when the sidewalks are slippery. If you live in an area that gets snow and ice in the winter, sprinkle salt on slippery steps and sidewalks.   Preventing falls in the bath  · Install grab bars and nonskid mats inside and outside your shower or tub and near the toilet and sinks. · Use shower chairs and bath benches. · Use a hand-held shower head that will allow you to sit while showering. · Get into a tub or shower by putting the weaker leg in first. Get out of a tub or shower with your strong side first.  · Repair loose toilet seats and consider installing a raised toilet seat to make getting on and off the toilet easier. · Keep your bathroom door unlocked while you are in the shower. Where can you learn more? Go to http://trevor-nisreen.info/. Enter [phone] in the search box to learn more about \"Preventing Falls: Care Instructions. \"  Current as of: August 4, 2016  Content Version: 11.3  © 4275-1644 [employer]. Care instructions adapted under license by [employer] (which disclaims liability or warranty for this information). If you have questions about a medical condition or this instruction, always ask your healthcare professional. [address] any warranty or liability for your use of this information.

## 2018-03-01 NOTE — PROGRESS NOTES
[facility]               Dimple Parker MD        [address] 78 [phone] Fax [phone]               Patient Information  Date:3/1/2018  Name : Jade William 80 y.o.     YOB: 1933         Referred by: Celeste Rosas NP        No chief complaint on file. History of Present Illness: Jade William is a 80 y.o. female here for fu of  Type 2 Diabetes Mellitus. She is here for management of type 2 diabetes mellitus. She was diagnosed with diabetes when she was in her 46s. She lives alone, eats sometimes only one meal a day and munches all day. As far as oral medications, her family is helping her both in the morning and the evening. One of the family members come to make sure she takes her medications and checks blood glucose. Reviewed the log - no hypos   She is checking blood glucose twice daily, family helping with insulin. Appetite fluctuates, weight is also fluctuating and she has lost weight. No polyuria, polydipsia. Occasionally blood glucoses in 300s when she eats snacks. Wt Readings from Last 3 Encounters:   10/30/17 126 lb (57.2 kg)   07/28/17 139 lb 4.8 oz (63.2 kg)       BP Readings from Last 3 Encounters:   10/30/17 132/57   07/28/17 152/54           Past Medical History:   Diagnosis Date    Alzheimer disease     Anxiety     COPD (chronic obstructive pulmonary disease) (HCC)     Depressive disorder     [facility]     HTN (hypertension)     Hyperlipemia      Current Outpatient Prescriptions   Medication Sig    insulin detemir (LEVEMIR FLEXTOUCH) 100 unit/mL (3 mL) inpn Inject 10 units in AM and 10 units at night    metFORMIN (GLUCOPHAGE) 1,000 mg tablet Take 1,000 mg by mouth two (2) times daily (with meals).  levothyroxine (SYNTHROID) 50 mcg tablet Take  by mouth Daily (before breakfast).  sertraline (ZOLOFT) 50 mg tablet Take  by mouth two (2) times a day.     lisinopril (PRINIVIL, ZESTRIL) 10 mg tablet Take  by mouth two (2) times a day.  hydroCHLOROthiazide (HYDRODIURIL) 25 mg tablet Take 25 mg by mouth daily. As needed    memantine (NAMENDA) 10 mg tablet Take  by mouth two (2) times a day.  simvastatin (ZOCOR) 20 mg tablet Take  by mouth nightly.  ALPRAZolam (XANAX) 0.25 mg tablet Take  by mouth. No current facility-administered medications for this visit. Allergies   Allergen Reactions    Aspirin Unknown (comments)    Codeine Unknown (comments)    Demerol [Meperidine] Unknown (comments)    Duricef [Cefadroxil] Unknown (comments)    Erythromycin Unknown (comments)    Lyrica [Pregabalin] Unknown (comments)    Morphine Unknown (comments)    Neurontin [Gabapentin] Rash and Swelling    Pcn [Penicillins] Rash and Swelling    Sulfa (Sulfonamide Antibiotics) Rash    Tetracyclines Rash    Toradol [Ketorolac] Unknown (comments)    Tramadol Unknown (comments)         Review of Systems:  - Constitutional Symptoms: no fevers, no chills,  - Eyes: no blurry vision no double vision  - Cardiovascular: no chest pain ,no palpitations  - Respiratory: no cough no shortness of breath  - Gastrointestinal: no dysphagia no  abdominal pain  - Musculoskeletal: + joint pains  +  weakness  - Integumentary: no rashes  - Neurological: + numbness, tingling, no  headaches  - Psychiatric: no depression no  anxiety  - Endocrine: no heat or cold intolerance    Physical Examination:   There were no vitals taken for this visit. Estimated body mass index is 21.63 kg/(m^2) as calculated from the following:    Height as of 10/30/17: 5' 4\" (1.626 m). -   Weight as of 10/30/17: 126 lb (57.2 kg).   - General: pleasant, no distress, good eye contact  - HEENT: no pallor, no periorbital edema, EOMI  - Neck: supple, no thyromegaly,   - Cardiovascular: regular, normal rate, normal S1 and S2,   - Respiratory: clear to auscultation bilaterally  - Gastrointestinal: soft, nontender, nondistended,  BS +  - Musculoskeletal: no proximal muscle weakness in upper or lower extremities  - Integumentary: no edema,  - Neurological:,alert and oriented  - Psychiatric: normal mood and affect  - Skin: color, texture, turgor normal.     Diabetic foot exam: March 2018    Left:    Vibratory sensation absent    Filament test absent sensation with micro filament   Pulse DP: 1+ (weak)    Deformities: Dry skin    Right:    Vibratory sensation absent   Filament test absent sensation with micro filament   Pulse DP: 1+ (weak)              Deformities: Dry skin    Data Reviewed:     [] Glucose records reviewed. [] See glucose records for details (to be scanned). [] A1C  [] Reviewed labs      Assessment/Plan:     No diagnosis found. 1. Type 2 Diabetes Mellitus with nephropathy,  Lab Results   Component Value Date/Time    Hemoglobin A1c (POC) 6.9 10/30/2017 10:27 AM     Control good    Check sugars before breakfast and before bedtime  Continue Metformin , will stop Metformin if  GFR is < 30   Levemir 10 units in AM and 6 units at night . One basal rate did not help her. Avoiding prandial insulin to avoid the confusion   Glucerna when not eating     2. HTN : Continue current therapy     3. Hyperlipidemia : On statin. 4. Hypothyroidism - on T4    Family member with patient     Follow-up Disposition: Not on File    Thank you for allowing me to participate in the care of this patient.     Marcelina Mcdonald MD      Patient verbalized understanding

## 2018-03-01 NOTE — MR AVS SNAPSHOT
[address] [facility] [address] 
[phone] Patient: Corbin Philip MRN: [id number] Visit Information Date & Time Provider Department Dept. Phone Encounter #  
 3/1/2018 10:45 AM Alayna Bashir MD [facility] [phone] [id number] Follow-up Instructions Return in about 6 months (around 9/1/2018). Upcoming Health Maintenance Date Due  
 LIPID PANEL Q1 1/24/1933 FOOT EXAM Q1 1/24/1943 MICROALBUMIN Q1 1/24/1943 EYE EXAM RETINAL OR DILATED Q1 1/24/1943 DTaP/Tdap/Td series (1 - Tdap) 1/24/1954 ZOSTER VACCINE AGE 60> 11/24/1992 GLAUCOMA SCREENING Q2Y 1/24/1998 OSTEOPOROSIS SCREENING (DEXA) 1/24/1998 Pneumococcal 65+ Low/Medium Risk (1 of 2 - PCV13) 1/24/1998 MEDICARE YEARLY EXAM 1/24/1998 Influenza Age 5 to Adult 8/1/2017 HEMOGLOBIN A1C Q6M 4/30/2018 Allergies as of 3/1/2018  Review Complete On: 3/1/2018 By: Alayna Bashir MD  
  
 Severity Noted Reaction Type Reactions Aspirin  07/28/2017    Unknown (comments) Codeine  07/28/2017    Unknown (comments) Demerol [Meperidine]  07/28/2017    Unknown (comments) Duricef [Cefadroxil]  07/28/2017    Unknown (comments) Erythromycin  07/28/2017    Unknown (comments) Lyrica [Pregabalin]  07/28/2017    Unknown (comments) Morphine  07/28/2017    Unknown (comments) Neurontin [Gabapentin]  07/28/2017    Rash, Swelling Pcn [Penicillins]  07/28/2017    Rash, Swelling Sulfa (Sulfonamide Antibiotics)  07/28/2017    Rash Tetracyclines  07/28/2017    Rash Toradol [Ketorolac]  07/28/2017    Unknown (comments) Tramadol  07/28/2017    Unknown (comments) Current Immunizations  Never Reviewed No immunizations on file. Not reviewed this visit You Were Diagnosed With   
  
 Codes Comments  Type 2 diabetes mellitus with hyperglycemia, with long-term current use of insulin ([facility]    -  Primary ICD-10-CM: E11.65, Z79.4 ICD-9-CM: 250.00, 790.29, V58.67 Hypothyroidism due to acquired atrophy of thyroid     ICD-10-CM: E03.4 ICD-9-CM: 244.8, 246.8 Essential hypertension     ICD-10-CM: I10 
ICD-9-CM: 401.9 Vitals BP Pulse Temp Resp Height(growth percentile) Weight(growth percentile) 123/77 (BP 1 Location: Right arm, BP Patient Position: Sitting) 74 96.5 °F (35.8 °C) (Oral) 16 5' 4\" (1.626 m) 128 lb 11.2 oz (58.4 kg) SpO2 BMI OB Status Smoking Status 95% 22.09 kg/m2 Hysterectomy Former Smoker Vitals History BMI and BSA Data Body Mass Index Body Surface Area 22.09 kg/m 2 1.62 m 2 Preferred Pharmacy Pharmacy Name Phone [id number]- [place] [address] [phone] Your Updated Medication List  
  
   
This list is accurate as of 3/1/18 11:57 AM.  Always use your most recent med list.  
  
  
  
  
 ALPRAZolam 0.25 mg tablet Commonly known as:  Litpercy Yesi Take  by mouth. amLODIPine 10 mg tablet Commonly known as:  Peyton Marin TAKE 1 TABLET BY MOUTH 2 TIMES A DAY cholecalciferol 5,000 unit capsule Commonly known as:  VITAMIN D3  
TAKE ONE SOFTGEL BY MOUTH EVERY DAY  
  
 hydroCHLOROthiazide 25 mg tablet Commonly known as:  HYDRODIURIL Take 25 mg by mouth daily. As needed  
  
 insulin detemir U-100 100 unit/mL (3 mL) Inpn Commonly known as:  LEVEMIR FLEXTOUCH U-100 INSULN Inject 10 units in AM and 10 units at night KLOR-CON M20 20 mEq tablet Generic drug:  potassium chloride TAKE 1 TABLET EVERY DAY  
  
 levothyroxine 50 mcg tablet Commonly known as:  SYNTHROID Take  by mouth Daily (before breakfast). lisinopril 10 mg tablet Commonly known as:  Zurita Boor Take  by mouth two (2) times a day. losartan 50 mg tablet Commonly known as:  COZAAR  
TAKE 1 TABLET EVERY DAY  
  
 melatonin 3 mg tablet TAKE 1 TABLET AT BEDTIME  
  
 memantine 10 mg tablet Commonly known as:  Sondra Ro Take  by mouth two (2) times a day. metFORMIN 1,000 mg tablet Commonly known as:  GLUCOPHAGE Take 1,000 mg by mouth two (2) times daily (with meals). risperiDONE 0.5 mg disintegrating tablet Commonly known as:  RisperDAL m-tabs TAKE AS DIRECTED. NO MORE THAN 2 TABS IN 24 HOURS * sertraline 50 mg tablet Commonly known as:  ZOLOFT Take  by mouth two (2) times a day. * sertraline 100 mg tablet Commonly known as:  ZOLOFT  
  
 simvastatin 20 mg tablet Commonly known as:  ZOCOR Take  by mouth nightly. TRADJENTA 5 mg tablet Generic drug:  linagliptin TAKE 1 TABLET AT BEDTIME  
  
 * Notice: This list has 2 medication(s) that are the same as other medications prescribed for you. Read the directions carefully, and ask your doctor or other care provider to review them with you. We Performed the Following AMB POC GLUCOSE, QUANTITATIVE, BLOOD [90904 CPT(R)] AMB POC HEMOGLOBIN A1C [54777 CPT(R)] LDL, DIRECT [id number] CPT(R)] METABOLIC PANEL, COMPREHENSIVE [80900 CPT(R)] MICROALBUMIN, UR, RAND W/ MICROALB/CREAT RATIO [id number] CPT(R)] Follow-up Instructions Return in about 6 months (around 9/1/2018). Patient Instructions Check sugars before breakfast and before bedtime Continue Metformin Levemir 10 units in AM and 6 units at night If sugars are less than 100  then do not take Levemir Preventing Falls: Care Instructions Your Care Instructions Getting around your home safely can be a challenge if you have injuries or health problems that make it easy for you to fall. Loose rugs and furniture in walkways are among the dangers for many older people who have problems walking or who have poor eyesight.  People who have conditions such as arthritis, osteoporosis, or dementia also have to be careful not to fall. You can make your home safer with a few simple measures. Follow-up care is a key part of your treatment and safety. Be sure to make and go to all appointments, and call your doctor if you are having problems. It's also a good idea to know your test results and keep a list of the medicines you take. How can you care for yourself at home? Taking care of yourself · You may get dizzy if you do not drink enough water. To prevent dehydration, drink plenty of fluids, enough so that your urine is light yellow or clear like water. Choose water and other caffeine-free clear liquids. If you have kidney, heart, or liver disease and have to limit fluids, talk with your doctor before you increase the amount of fluids you drink. · Exercise regularly to improve your strength, muscle tone, and balance. Walk if you can. Swimming may be a good choice if you cannot walk easily. · Have your vision and hearing checked each year or any time you notice a change. If you have trouble seeing and hearing, you might not be able to avoid objects and could lose your balance. · Know the side effects of the medicines you take. Ask your doctor or pharmacist whether the medicines you take can affect your balance. Sleeping pills or sedatives can affect your balance. · Limit the amount of alcohol you drink. Alcohol can impair your balance and other senses. · Ask your doctor whether calluses or corns on your feet need to be removed. If you wear loose-fitting shoes because of calluses or corns, you can lose your balance and fall. · Talk to your doctor if you have numbness in your feet. Preventing falls at home · Remove raised doorway thresholds, throw rugs, and clutter. Repair loose carpet or raised areas in the floor. · Move furniture and electrical cords to keep them out of walking paths. · Use nonskid floor wax, and wipe up spills right away, especially on ceramic tile floors. · If you use a walker or cane, put rubber tips on it. If you use crutches, clean the bottoms of them regularly with an abrasive pad, such as steel wool. · Keep your house well lit, especially Vicenta Pun, and outside walkways. Use night-lights in areas such as hallways and bathrooms. Add extra light switches or use remote switches (such as switches that go on or off when you clap your hands) to make it easier to turn lights on if you have to get up during the night. · Install sturdy handrails on stairways. · Move items in your cabinets so that the things you use a lot are on the lower shelves (about waist level). · Keep a cordless phone and a flashlight with new batteries by your bed. If possible, put a phone in each of the main rooms of your house, or carry a cell phone in case you fall and cannot reach a phone. Or, you can wear a device around your neck or wrist. You push a button that sends a signal for help. · Wear low-heeled shoes that fit well and give your feet good support. Use footwear with nonskid soles. Check the heels and soles of your shoes for wear. Repair or replace worn heels or soles. · Do not wear socks without shoes on wood floors. · Walk on the grass when the sidewalks are slippery. If you live in an area that gets snow and ice in the winter, sprinkle salt on slippery steps and sidewalks. Preventing falls in the bath · Install grab bars and nonskid mats inside and outside your shower or tub and near the toilet and sinks. · Use shower chairs and bath benches. · Use a hand-held shower head that will allow you to sit while showering. · Get into a tub or shower by putting the weaker leg in first. Get out of a tub or shower with your strong side first. 
· Repair loose toilet seats and consider installing a raised toilet seat to make getting on and off the toilet easier. · Keep your bathroom door unlocked while you are in the shower. Where can you learn more? Go to http://trevor-nisreen.info/. Enter [phone] in the search box to learn more about \"Preventing Falls: Care Instructions. \" Current as of: August 4, 2016 Content Version: 11.3 © 4982-1999 [employer]. Care instructions adapted under license by [employer] (which disclaims liability or warranty for this information). If you have questions about a medical condition or this instruction, always ask your healthcare professional. [address] any warranty or liability for your use of this information. Introducing [facility]! Ricci Rico introduces [employer] patient portal. Now you can access parts of your medical record, email your doctor's office, and request medication refills online. 1. In your internet browser, go to [email or web address] 2. Click on the First Time User? Click Here link in the Sign In box. You will see the New Member Sign Up page. 3. Enter your [employer] Access Code exactly as it appears below. You will not need to use this code after youve completed the sign-up process. If you do not sign up before the expiration date, you must request a new code. · [employer] Access Code: [email or web address] Expires: 5/30/2018 11:57 AM 
 
4. Enter the last four digits of your Social Security Number (xxxx) and Date of Birth (mm/dd/yyyy) as indicated and click Submit. You will be taken to the next sign-up page. 5. Create a [employer]Living ID. This will be your [employer] login ID and cannot be changed, so think of one that is secure and easy to remember. 6. Create a [employer] password. You can change your password at any time. 7. Enter your Password Reset Question and Answer. This can be used at a later time if you forget your password. 8. Enter your e-mail address. You will receive e-mail notification when new information is available in [address] 9. Click Sign Up.  You can now view and download portions of your medical record. 10. Click the Download Summary menu link to download a portable copy of your medical information. If you have questions, please visit the Frequently Asked Questions section of the [employer] website. Remember, [employer] is NOT to be used for urgent needs. For medical emergencies, dial 911. Now available from your iPhone and Android! Please provide this summary of care documentation to your next provider. If you have any questions after today's visit, please call [phone].

## 2018-03-02 LAB
ALBUMIN SERPL-MCNC: 4.4 G/DL (ref 3.5–4.7)
ALBUMIN/CREAT UR: 59.5 MG/G CREAT (ref 0–30)
ALBUMIN/GLOB SERPL: 1.6 {RATIO} (ref 1.2–2.2)
ALP SERPL-CCNC: 102 IU/L (ref 39–117)
ALT SERPL-CCNC: 8 IU/L (ref 0–32)
AST SERPL-CCNC: 10 IU/L (ref 0–40)
BILIRUB SERPL-MCNC: 0.3 MG/DL (ref 0–1.2)
BUN SERPL-MCNC: 18 MG/DL (ref 8–27)
BUN/CREAT SERPL: 13 (ref 12–28)
CALCIUM SERPL-MCNC: 9.3 MG/DL (ref 8.7–10.3)
CHLORIDE SERPL-SCNC: 98 MMOL/L (ref 96–106)
CO2 SERPL-SCNC: 24 MMOL/L (ref 18–29)
CREAT SERPL-MCNC: 1.39 MG/DL (ref 0.57–1)
CREAT UR-MCNC: 139.6 MG/DL
GFR SERPLBLD CREATININE-BSD FMLA CKD-EPI: 35 ML/MIN/1.73
GFR SERPLBLD CREATININE-BSD FMLA CKD-EPI: 40 ML/MIN/1.73
GLOBULIN SER CALC-MCNC: 2.8 G/DL (ref 1.5–4.5)
GLUCOSE SERPL-MCNC: 133 MG/DL (ref 65–99)
INTERPRETATION: NORMAL
LDLC SERPL DIRECT ASSAY-MCNC: 140 MG/DL (ref 0–99)
Lab: NORMAL
MICROALBUMIN UR-MCNC: 83 UG/ML
POTASSIUM SERPL-SCNC: 5.3 MMOL/L (ref 3.5–5.2)
PROT SERPL-MCNC: 7.2 G/DL (ref 6–8.5)
SODIUM SERPL-SCNC: 140 MMOL/L (ref 134–144)

## 2018-03-03 PROBLEM — E11.21 TYPE 2 DIABETES WITH NEPHROPATHY (HCC): Status: ACTIVE | Noted: 2018-03-03

## 2018-04-09 ENCOUNTER — TELEPHONE (OUTPATIENT)
Dept: ENDOCRINOLOGY | Age: 83
End: 2018-04-09

## 2018-04-09 DIAGNOSIS — E11.65 TYPE 2 DIABETES MELLITUS WITH HYPERGLYCEMIA, WITH LONG-TERM CURRENT USE OF INSULIN (HCC): ICD-10-CM

## 2018-04-09 DIAGNOSIS — Z79.4 TYPE 2 DIABETES MELLITUS WITH HYPERGLYCEMIA, WITH LONG-TERM CURRENT USE OF INSULIN (HCC): ICD-10-CM

## 2018-04-09 NOTE — TELEPHONE ENCOUNTER
Spoke with patient's son. He stated patient's blood sugar has been elevated. He stated she was in hospital 3 months ago for medication evaluation and they discontinued the metformin. Patient is still taking Tradjenta and Levemir as prescribed. Example of blood sugars are as follows:  04/01 [phone] 442  04/07 0930 284 1830 310  04/08 0930 243 1900 100  04/09 1015 235  Asked about diet and he stated she eats what they cook such as hamburgers, spaghetti. She will only eat toast for breakfast. Please advise.

## 2018-04-09 NOTE — TELEPHONE ENCOUNTER
Increase Levemir to 16 units in AM and 10 units at night   Continue Tradjenta     Stay off metformin

## 2018-04-09 NOTE — TELEPHONE ENCOUNTER
----- Message from Tuan Brannon sent at 4/9/2018 12:26 PM EDT -----  Regarding: DrBianka Gale, Son, is requesting a call, in reference to the pt's blood sugar being extremely high, and has questions about the medication changes. Best contact number [phone] or [phone].

## 2018-04-09 NOTE — TELEPHONE ENCOUNTER
Informed patient's son of insulin changes, stay on tradjenta and stay off of metformin. He verbalized understanding.

## 2018-05-28 ENCOUNTER — ED HISTORICAL/CONVERTED ENCOUNTER (OUTPATIENT)
Dept: OTHER | Age: 83
End: 2018-05-28

## 2018-08-31 ENCOUNTER — TELEPHONE (OUTPATIENT)
Dept: ENDOCRINOLOGY | Age: 83
End: 2018-08-31

## 2018-08-31 DIAGNOSIS — E11.65 TYPE 2 DIABETES MELLITUS WITH HYPERGLYCEMIA, UNSPECIFIED WHETHER LONG TERM INSULIN USE (HCC): Primary | ICD-10-CM

## 2018-09-13 ENCOUNTER — OFFICE VISIT (OUTPATIENT)
Dept: ENDOCRINOLOGY | Age: 83
End: 2018-09-13

## 2018-09-13 VITALS
OXYGEN SATURATION: 95 % | HEART RATE: 66 BPM | RESPIRATION RATE: 18 BRPM | TEMPERATURE: 96.4 F | WEIGHT: 150 LBS | SYSTOLIC BLOOD PRESSURE: 153 MMHG | HEIGHT: 64 IN | BODY MASS INDEX: 25.61 KG/M2 | DIASTOLIC BLOOD PRESSURE: 73 MMHG

## 2018-09-13 DIAGNOSIS — E11.65 TYPE 2 DIABETES MELLITUS WITH HYPERGLYCEMIA, WITH LONG-TERM CURRENT USE OF INSULIN (HCC): Primary | ICD-10-CM

## 2018-09-13 DIAGNOSIS — I10 ESSENTIAL HYPERTENSION: ICD-10-CM

## 2018-09-13 DIAGNOSIS — E03.4 HYPOTHYROIDISM DUE TO ACQUIRED ATROPHY OF THYROID: ICD-10-CM

## 2018-09-13 DIAGNOSIS — Z79.4 TYPE 2 DIABETES MELLITUS WITH HYPERGLYCEMIA, WITH LONG-TERM CURRENT USE OF INSULIN (HCC): Primary | ICD-10-CM

## 2018-09-13 LAB
GLUCOSE POC: 135 MG/DL
HBA1C MFR BLD HPLC: 7 %

## 2018-09-13 NOTE — PROGRESS NOTES
Barbara Larson is a 80 y.o. female here for   Chief Complaint   Patient presents with    Diabetes       Functional glucose monitor and record keeping system? - yes  Eye exam within last year? - not yet  Foot exam within last year? - on file    1. Have you been to the ER, urgent care clinic since your last visit? Hospitalized since your last visit? -no    2. Have you seen or consulted any other health care providers outside of the [address] [place] [address] since your last visit?   Include any pap smears or colon screening.- Farhan Rowland

## 2018-09-13 NOTE — PROGRESS NOTES
[facility]               Kalen Sarabia MD        [address] 78 [phone] Fax [phone]               Patient Information  Date:9/13/2018  Name : Kavya Roa 80 y.o.     YOB: 1933         Referred by: Pankaj Perez NP        Chief Complaint   Patient presents with    Diabetes       History of Present Illness: Kavya Roa is a 80 y.o. female here for fu of  Type 2 Diabetes Mellitus. She is here for management of type 2 diabetes mellitus. She was diagnosed with diabetes when she was in her 46s. She lives alone, eats sometimes only one meal a day and munches all day. As far as oral medications, her family is helping her both in the morning and the evening. One of the family members come to make sure she takes her medications and checks blood glucose. Reviewed the log - no hypos   Some of the blood glucose are after she eats  She is checking blood glucose twice daily, family helping with insulin. Appetite fluctuates, weight is also fluctuating     Reports \"I eat whatever I want\"    No polyuria, polydipsia.   Occasionally blood glucoses in 300s when she eats snacks and blood glucose I suspect is checked right after that        Wt Readings from Last 3 Encounters:   09/13/18 150 lb (68 kg)   03/01/18 128 lb 11.2 oz (58.4 kg)   10/30/17 126 lb (57.2 kg)       BP Readings from Last 3 Encounters:   09/13/18 153/73   03/01/18 123/77   10/30/17 132/57           Past Medical History:   Diagnosis Date    Alzheimer disease     Anxiety     [facility]     Depressive disorder     DM [facility]diabetes mellitus) [facility]     HTN (hypertension)     Hyperlipemia      Current Outpatient Prescriptions   Medication Sig    insulin detemir U-100 (LEVEMIR FLEXTOUCH U-100 INSULN) 100 unit/mL (3 mL) inpn [id number] units in AM and10 units at night (Patient taking differently: Inject 12 units in AM and10 units at night)    risperiDONE (RISPERDAL M-TABS) 0.5 mg disintegrating tablet TAKE AS DIRECTED. NO MORE THAN 2 TABS IN 24 HOURS    KLOR-CON M20 20 mEq tablet TAKE 1 TABLET EVERY DAY    melatonin 3 mg tablet TAKE 1 TABLET AT BEDTIME    losartan (COZAAR) 50 mg tablet TAKE 1 TABLET EVERY DAY    TRADJENTA 5 mg tablet TAKE 1 TABLET AT BEDTIME    amLODIPine (NORVASC) 10 mg tablet TAKE 1 TABLET BY MOUTH 2 TIMES A DAY    cholecalciferol (VITAMIN D3) 5,000 unit capsule TAKE ONE SOFTGEL BY MOUTH EVERY DAY    Insulin Needles, Disposable, (CASSANDRA PEN NEEDLE) 32 gauge x 5/32\" ndle Use to inject Levemir BID Dx Code: E11.65    metFORMIN (GLUCOPHAGE) 1,000 mg tablet Take 1,000 mg by mouth two (2) times daily (with meals).  levothyroxine (SYNTHROID) 50 mcg tablet Take  by mouth Daily (before breakfast).  sertraline (ZOLOFT) 50 mg tablet Take  by mouth two (2) times a day.  lisinopril (PRINIVIL, ZESTRIL) 10 mg tablet Take  by mouth two (2) times a day.  hydroCHLOROthiazide (HYDRODIURIL) 25 mg tablet Take 25 mg by mouth daily. As needed    memantine (NAMENDA) 10 mg tablet Take  by mouth two (2) times a day.  simvastatin (ZOCOR) 20 mg tablet Take  by mouth nightly.  sertraline (ZOLOFT) 100 mg tablet     ALPRAZolam (XANAX) 0.25 mg tablet Take  by mouth. No current facility-administered medications for this visit.       Allergies   Allergen Reactions    Aspirin Unknown (comments)    Codeine Unknown (comments)    Demerol [Meperidine] Unknown (comments)    Duricef [Cefadroxil] Unknown (comments)    Erythromycin Unknown (comments)    Lyrica [Pregabalin] Unknown (comments)    Morphine Unknown (comments)    Neurontin [Gabapentin] Rash and Swelling    Pcn [Penicillins] Rash and Swelling    Sulfa (Sulfonamide Antibiotics) Rash    Tetracyclines Rash    Toradol [Ketorolac] Unknown (comments)    Tramadol Unknown (comments)         Review of Systems:  - Constitutional Symptoms: no fevers, no chills,  - Eyes: no blurry vision no double vision  - Cardiovascular: no chest pain ,no palpitations  - Respiratory: no cough no shortness of breath  - Gastrointestinal: no dysphagia no  abdominal pain  - Musculoskeletal: + joint pains  +  weakness  - Integumentary: no rashes  - Neurological: + numbness, tingling, no  headaches  - Psychiatric: no depression no  anxiety  - Endocrine: no heat or cold intolerance    Physical Examination:   Blood pressure 153/73, pulse 66, temperature 96.4 °F (35.8 °C), temperature source Oral, resp. rate 18, height 5' 4\" (1.626 m), weight 150 lb (68 kg), SpO2 95 %. Estimated body mass index is 25.75 kg/(m^2) as calculated from the following:    Height as of this encounter: 5' 4\" (1.626 m). -   Weight as of this encounter: 150 lb (68 kg). - General: pleasant, no distress, good eye contact  - HEENT: no pallor, no periorbital edema, EOMI  - Neck: supple, no thyromegaly,   - Cardiovascular: regular, normal rate, normal S1 and S2,   - Respiratory: clear to auscultation bilaterally  - Gastrointestinal: soft, nontender, nondistended,  BS +  - Musculoskeletal: no proximal muscle weakness in upper or lower extremities  - Integumentary: no edema,  - Neurological:,alert and oriented  - Psychiatric: normal mood and affect  - Skin: color, texture, turgor normal.     Diabetic foot exam: March 2018    Left:    Vibratory sensation absent    Filament test absent sensation with micro filament   Pulse DP: 1+ (weak)    Deformities: Dry skin    Right:    Vibratory sensation absent   Filament test absent sensation with micro filament   Pulse DP: 1+ (weak)              Deformities: Dry skin    Data Reviewed:     [] Glucose records reviewed. [] See glucose records for details (to be scanned). [] A1C  [] Reviewed labs      Assessment/Plan:     1. Type 2 diabetes mellitus with hyperglycemia, with long-term current use of insulin ([address]    2. Hypothyroidism due to acquired atrophy of thyroid    3.  Essential hypertension 1. Type 2 Diabetes Mellitus with nephropathy,  Lab Results   Component Value Date/Time    Hemoglobin A1c (POC) 7 09/13/2018 11:00 AM     Control good    Check sugars before breakfast and before bedtime     Levemir 12 units in AM and 10 units at night . One basal rate did not help her. Avoiding prandial insulin to avoid the confusion   Glucerna when not eating     2. HTN : Continue current therapy     3. Hyperlipidemia : On statin. 4. Hypothyroidism - on T4    Family member with patient     Follow-up Disposition: Not on File    Thank you for allowing me to participate in the care of this patient.     Duran Chilel MD      Patient verbalized understanding

## 2018-09-13 NOTE — MR AVS SNAPSHOT
[address] [id number] 
[phone] Patient: Kingsley Goetz MRN: [id number] PDS:7/72/0488 Visit Information Date & Time Provider Department Dept. Phone Encounter #  
 9/13/2018 10:45 AM Disha Robledo MD [facility] [phone] [id number] Follow-up Instructions Return in about 5 months (around 2/13/2019). Upcoming Health Maintenance Date Due  
 LIPID PANEL Q1 1/24/1933 EYE EXAM RETINAL OR DILATED Q1 1/24/1943 DTaP/Tdap/Td series (1 - Tdap) 1/24/1954 ZOSTER VACCINE AGE 60> 11/24/1992 GLAUCOMA SCREENING Q2Y 1/24/1998 Bone Densitometry (Dexa) Screening 1/24/1998 Pneumococcal 65+ Low/Medium Risk (1 of 2 - PCV13) 1/24/1998 MEDICARE YEARLY EXAM 3/14/2018 Influenza Age 5 to Adult 8/1/2018 HEMOGLOBIN A1C Q6M 9/1/2018 MICROALBUMIN Q1 3/1/2019 FOOT EXAM Q1 3/3/2019 Allergies as of 9/13/2018  Review Complete On: 9/13/2018 By: Disha Robledo MD  
  
 Severity Noted Reaction Type Reactions Aspirin  07/28/2017    Unknown (comments) Codeine  07/28/2017    Unknown (comments) Demerol [Meperidine]  07/28/2017    Unknown (comments) Duricef [Cefadroxil]  07/28/2017    Unknown (comments) Erythromycin  07/28/2017    Unknown (comments) Lyrica [Pregabalin]  07/28/2017    Unknown (comments) Morphine  07/28/2017    Unknown (comments) Neurontin [Gabapentin]  07/28/2017    Rash, Swelling Pcn [Penicillins]  07/28/2017    Rash, Swelling Sulfa (Sulfonamide Antibiotics)  07/28/2017    Rash Tetracyclines  07/28/2017    Rash Toradol [Ketorolac]  07/28/2017    Unknown (comments) Tramadol  07/28/2017    Unknown (comments) Current Immunizations  Never Reviewed No immunizations on file. Not reviewed this visit You Were Diagnosed With   
  
 Codes Comments  Type 2 diabetes mellitus with hyperglycemia, with long-term current use of insulin ([facility]    -  Primary ICD-10-CM: E11.65, Z79.4 ICD-9-CM: 250.00, 790.29, V58.67 Hypothyroidism due to acquired atrophy of thyroid     ICD-10-CM: E03.4 ICD-9-CM: 244.8, 246.8 Essential hypertension     ICD-10-CM: I10 
ICD-9-CM: 401.9 Vitals BP Pulse Temp Resp Height(growth percentile) Weight(growth percentile) 153/73 (BP 1 Location: Right arm, BP Patient Position: Sitting) 66 96.4 °F (35.8 °C) (Oral) 18 5' 4\" (1.626 m) 150 lb (68 kg) SpO2 BMI OB Status Smoking Status 95% 25.75 kg/m2 Hysterectomy Former Smoker Vitals History BMI and BSA Data Body Mass Index Body Surface Area 25.75 kg/m 2 1.75 m 2 Preferred Pharmacy Pharmacy Name Phone [id number]- [place] [address] [phone] Your Updated Medication List  
  
   
This list is accurate as of 9/13/18 11:22 AM.  Always use your most recent med list.  
  
  
  
  
 ALPRAZolam 0.25 mg tablet Commonly known as:  Kaleta Enma Take  by mouth. amLODIPine 10 mg tablet Commonly known as:  Lennis Eagles TAKE 1 TABLET BY MOUTH 2 TIMES A DAY cholecalciferol 5,000 unit capsule Commonly known as:  VITAMIN D3  
TAKE ONE SOFTGEL BY MOUTH EVERY DAY  
  
 hydroCHLOROthiazide 25 mg tablet Commonly known as:  HYDRODIURIL Take 25 mg by mouth daily. As needed  
  
 insulin detemir U-100 100 unit/mL (3 mL) Inpn Commonly known as:  LEVEMIR FLEXTOUCH U-100 INSULN [id number] units in AM and10 units at night Insulin Needles (Disposable) 32 gauge x 5/32\" Ndle Commonly known as:  Reyna Pen Needle Use to inject Levemir BID Dx Code: E11.65  
  
 KLOR-CON M20 20 mEq tablet Generic drug:  potassium chloride TAKE 1 TABLET EVERY DAY  
  
 levothyroxine 50 mcg tablet Commonly known as:  SYNTHROID Take  by mouth Daily (before breakfast). lisinopril 10 mg tablet Commonly known as:  Aubrey Berkowitz  
 Take  by mouth two (2) times a day. losartan 50 mg tablet Commonly known as:  COZAAR  
TAKE 1 TABLET EVERY DAY  
  
 melatonin 3 mg tablet TAKE 1 TABLET AT BEDTIME  
  
 memantine 10 mg tablet Commonly known as:  Lesle Blanca Take  by mouth two (2) times a day. risperiDONE 0.5 mg disintegrating tablet Commonly known as:  RisperDAL m-tabs TAKE AS DIRECTED. NO MORE THAN 2 TABS IN 24 HOURS * sertraline 50 mg tablet Commonly known as:  ZOLOFT Take  by mouth two (2) times a day. * sertraline 100 mg tablet Commonly known as:  ZOLOFT  
  
 simvastatin 20 mg tablet Commonly known as:  ZOCOR Take  by mouth nightly. TRADJENTA 5 mg tablet Generic drug:  linagliptin TAKE 1 TABLET AT BEDTIME  
  
 * Notice: This list has 2 medication(s) that are the same as other medications prescribed for you. Read the directions carefully, and ask your doctor or other care provider to review them with you. We Performed the Following AMB POC GLUCOSE, QUANTITATIVE, BLOOD [70538 CPT(R)] AMB POC HEMOGLOBIN A1C [18995 CPT(R)] Follow-up Instructions Return in about 5 months (around 2/13/2019). Patient Instructions Check sugars before breakfast and before bedtime Continue Metformin Levemir 12 units in AM and 10 units at night If sugars are less than 100  then do not take Levemir Introducing [facility]! Michael Mustafa introduces Depop patient portal. Now you can access parts of your medical record, email your doctor's office, and request medication refills online. 1. In your internet browser, go to [email or web address] 2. Click on the First Time User? Click Here link in the Sign In box. You will see the New Member Sign Up page. 3. Enter your Depop Access Code exactly as it appears below. You will not need to use this code after youve completed the sign-up process.  If you do not sign up before the expiration date, you must request a new code. · Vimbly Access Code: [email or web address] Expires: 12/12/2018 10:48 AM 
 
4. Enter the last four digits of your Social Security Number (xxxx) and Date of Birth (mm/dd/yyyy) as indicated and click Submit. You will be taken to the next sign-up page. 5. Create a Vimbly ID. This will be your Vimbly login ID and cannot be changed, so think of one that is secure and easy to remember. 6. Create a Vimbly password. You can change your password at any time. 7. Enter your Password Reset Question and Answer. This can be used at a later time if you forget your password. 8. Enter your e-mail address. You will receive e-mail notification when new information is available in [address] 9. Click Sign Up. You can now view and download portions of your medical record. 10. Click the Download Summary menu link to download a portable copy of your medical information. If you have questions, please visit the Frequently Asked Questions section of the Vimbly website. Remember, Vimbly is NOT to be used for urgent needs. For medical emergencies, dial 911. Now available from your iPhone and Android! Please provide this summary of care documentation to your next provider. If you have any questions after today's visit, please call [phone].

## 2018-09-13 NOTE — PATIENT INSTRUCTIONS
Check sugars before breakfast and before bedtime    Continue Metformin     Levemir 12 units in AM and 10 units at night     If sugars are less than 100  then do not take Levemir

## 2018-11-28 ENCOUNTER — ED HISTORICAL/CONVERTED ENCOUNTER (OUTPATIENT)
Dept: OTHER | Age: 83
End: 2018-11-28

## 2019-01-01 ENCOUNTER — TELEPHONE (OUTPATIENT)
Dept: ENDOCRINOLOGY | Age: 84
End: 2019-01-01

## 2019-01-01 ENCOUNTER — ED HISTORICAL/CONVERTED ENCOUNTER (OUTPATIENT)
Dept: OTHER | Age: 84
End: 2019-01-01

## 2019-01-01 ENCOUNTER — OFFICE VISIT (OUTPATIENT)
Dept: ENDOCRINOLOGY | Age: 84
End: 2019-01-01

## 2019-01-01 ENCOUNTER — OP HISTORICAL/CONVERTED ENCOUNTER (OUTPATIENT)
Dept: OTHER | Age: 84
End: 2019-01-01

## 2019-01-01 VITALS
TEMPERATURE: 96.9 F | HEIGHT: 64 IN | DIASTOLIC BLOOD PRESSURE: 56 MMHG | WEIGHT: 156.8 LBS | RESPIRATION RATE: 14 BRPM | HEART RATE: 87 BPM | SYSTOLIC BLOOD PRESSURE: 126 MMHG | OXYGEN SATURATION: 95 % | BODY MASS INDEX: 26.77 KG/M2

## 2019-01-01 DIAGNOSIS — E11.65 TYPE 2 DIABETES MELLITUS WITH HYPERGLYCEMIA, WITH LONG-TERM CURRENT USE OF INSULIN (HCC): ICD-10-CM

## 2019-01-01 DIAGNOSIS — E03.4 HYPOTHYROIDISM DUE TO ACQUIRED ATROPHY OF THYROID: ICD-10-CM

## 2019-01-01 DIAGNOSIS — Z79.4 TYPE 2 DIABETES MELLITUS WITH HYPERGLYCEMIA, WITH LONG-TERM CURRENT USE OF INSULIN (HCC): Primary | ICD-10-CM

## 2019-01-01 DIAGNOSIS — E11.65 TYPE 2 DIABETES MELLITUS WITH HYPERGLYCEMIA, WITH LONG-TERM CURRENT USE OF INSULIN (HCC): Primary | ICD-10-CM

## 2019-01-01 DIAGNOSIS — I10 ESSENTIAL HYPERTENSION: ICD-10-CM

## 2019-01-01 DIAGNOSIS — Z79.4 TYPE 2 DIABETES MELLITUS WITH HYPERGLYCEMIA, WITH LONG-TERM CURRENT USE OF INSULIN (HCC): ICD-10-CM

## 2019-01-01 DIAGNOSIS — N18.30 CHRONIC KIDNEY DISEASE (CKD), STAGE III (MODERATE) (HCC): ICD-10-CM

## 2019-01-01 LAB
GLUCOSE POC: 293 MG/DL
HBA1C MFR BLD HPLC: 7.2 %

## 2019-01-01 RX ORDER — INSULIN ASPART 100 [IU]/ML
INJECTION, SOLUTION INTRAVENOUS; SUBCUTANEOUS
Qty: 15 ML | Refills: 3 | Status: SHIPPED | OUTPATIENT
Start: 2019-01-01

## 2019-01-01 RX ORDER — ATORVASTATIN CALCIUM 20 MG/1
TABLET, FILM COATED ORAL
Refills: 0 | COMMUNITY
Start: 2019-01-01

## 2019-08-09 NOTE — TELEPHONE ENCOUNTER
Son stated pt saw PCP Wednesday and her BG was 310. They do not check BG often and can't provide much more readings but stated the time before that it was high too. He stated pt likes to drink sweet tea, have donuts and sugar in her coffee. She is 80 y.o. and has dementia and while they try to limit those things they don't want to take it away from her completely. Pt is taking Levemir 15 units in the am and 10 units QHS, Metformin 1000 mg BID and Januvia 50 mg daily. She was diagnosed with a mild UTI on Wednesday and given Cipro. She has not had A1C done as their lab was down. Informed him that Dr. Bill Greene won't be able to do much without BG readings but I will let her know and call him back with what she says.

## 2019-08-09 NOTE — TELEPHONE ENCOUNTER
High sugars could be due to infection , I have not seen her since 9/2019 so hard to say anything or adjust     Understand her age but sugars will go very high and cause more problems if diet is not managed     Check sugars fasting and bedtime for a week and follow up

## 2019-08-09 NOTE — TELEPHONE ENCOUNTER
Son (on [address]) Mike Jefferson is concerned about blood sugars being in 300's she is on Levimir 15units in am and pm

## 2019-08-09 NOTE — TELEPHONE ENCOUNTER
Informed pt's son of Dr. Mariann Smith note. He verbalized understanding with no further questions or concerns at this time. Transferred him to  to schedule.

## 2019-08-27 PROBLEM — N18.30 CHRONIC KIDNEY DISEASE (CKD), STAGE III (MODERATE) (HCC): Status: ACTIVE | Noted: 2019-01-01

## 2019-08-27 NOTE — PATIENT INSTRUCTIONS
Check sugars before breakfast and before bedtime    Levemir 15 units in AM and 10 units at night     If sugars are less than 100  then do not take Levemir     Novolog or Humalog fast acting insulin  3 units before dinner

## 2019-08-27 NOTE — PROGRESS NOTES
Carrol Crouch is a 80 y.o. female here for   Chief Complaint   Patient presents with    Diabetes    Diabetic Foot Exam    Thyroid Problem       Functional glucose monitor and record keeping system? -yes   Eye exam within last year? - MELIZAI Dr. Jewel Rausch exam within last year? - due    1. Have you been to the ER, urgent care clinic since your last visit? Hospitalized since your last visit? -[facility] in Aug for UTI     2. Have you seen or consulted any other health care providers outside of the [address] [place] [address] since your last visit? Include any pap smears or colon screening. -PCP

## 2019-08-27 NOTE — LETTER
8/27/19 Patient: Raeann Tapia YOB: 1933 Date of Visit: 8/27/2019 Erwin Anguiano MD 
[place] [address] VIA Facsimile: [phone] Dear Erwin Anguiano MD, Thank you for referring Ms. Raeann Tapia to [facility] for evaluation. My notes for this consultation are attached. If you have questions, please do not hesitate to call me. I look forward to following your patient along with you. Sincerely, Robert Hobbs MD

## 2019-08-27 NOTE — PROGRESS NOTES
[facility]               Brooklyn Rivera MD        [address] 78 [phone] Fax [phone]               Patient Information  Date:8/27/2019  Name : Bakari Blanca 80 y.o.     YOB: 1933         Referred by: Jasmyn Martinez NP        Chief Complaint   Patient presents with    Diabetes    Diabetic Foot Exam    Thyroid Problem       History of Present Illness: Bakari Blanca is a 80 y.o. female here for fu of  Type 2 Diabetes Mellitus. She is here for management of type 2 diabetes mellitus. She was diagnosed with diabetes when she was in her 46s. As far as medications, her family is helping her both in the morning and the evening. She is drinking more sweeties, cakes and eats chocolates. Fasting are controlled, post dinner blood glucose is in 300s 60% of the time. She is on Levemir 15 units in the morning 10 unit at night. No hypoglycemia      Reports \"I eat whatever I want\"    No chest pain    Wt Readings from Last 3 Encounters:   08/27/19 156 lb 12.8 oz (71.1 kg)   09/13/18 150 lb (68 kg)   03/01/18 128 lb 11.2 oz (58.4 kg)       BP Readings from Last 3 Encounters:   08/27/19 126/56   09/13/18 153/73   03/01/18 123/77           Past Medical History:   Diagnosis Date    Alzheimer disease     Anxiety     COPD (chronic obstructive pulmonary disease) (HCC)     Depressive disorder     [facility]     HTN (hypertension)     Hyperlipemia      Current Outpatient Medications   Medication Sig    atorvastatin (LIPITOR) 20 mg tablet TAKE 1 TABLET EVERY DAY    SITagliptin (JANUVIA) 100 mg tablet Take 50 mg by mouth daily.     insulin detemir U-100 (LEVEMIR FLEXTOUCH U-100 INSULN) 100 unit/mL (3 mL) inpn [id number] units in AM and10 units at night (Patient taking differently: Inject 15 units in AM and10 units at night)    risperiDONE (RISPERDAL M-TABS) 0.5 mg disintegrating tablet 2 tabs in the am and 1 tab qhs    KLOR-CON M20 20 mEq tablet TAKE 1 TABLET EVERY DAY    melatonin 3 mg tablet TAKE 1 TABLET AT BEDTIME    losartan (COZAAR) 50 mg tablet TAKE 1 TABLET EVERY DAY    amLODIPine (NORVASC) 10 mg tablet TAKE 1 TABLET BY MOUTH 2 TIMES A DAY    cholecalciferol (VITAMIN D3) 5,000 unit capsule Take 50,000 Units by mouth every seven (7) days.  Insulin Needles, Disposable, (CASSANDRA PEN NEEDLE) 32 gauge x 5/32\" ndle Use to inject Levemir BID Dx Code: E11.65    levothyroxine (SYNTHROID) 50 mcg tablet Take  by mouth Daily (before breakfast).  sertraline (ZOLOFT) 50 mg tablet Take  by mouth two (2) times a day.  lisinopril (PRINIVIL, ZESTRIL) 10 mg tablet Take  by mouth two (2) times a day.  insulin detemir U-100 (LEVEMIR FLEXTOUCH U-100 INSULN) 100 unit/mL (3 mL) inpn INJECT 10 UNITS IN AM AND 6 UNITS AT NIGHT    insulin detemir U-100 (LEVEMIR FLEXTOUCH U-100 INSULN) 100 unit/mL (3 mL) inpn INJECT 10 UNITS IN AM AND 6 UNITS AT NIGHT    sertraline (ZOLOFT) 100 mg tablet     TRADJENTA 5 mg tablet TAKE 1 TABLET AT BEDTIME    hydroCHLOROthiazide (HYDRODIURIL) 25 mg tablet Take 25 mg by mouth daily. As needed    memantine (NAMENDA) 10 mg tablet Take  by mouth two (2) times a day.  simvastatin (ZOCOR) 20 mg tablet Take  by mouth nightly.  ALPRAZolam (XANAX) 0.25 mg tablet Take  by mouth. No current facility-administered medications for this visit.       Allergies   Allergen Reactions    Aspirin Unknown (comments)    Codeine Unknown (comments)    Demerol [Meperidine] Unknown (comments)    Duricef [Cefadroxil] Unknown (comments)    Erythromycin Unknown (comments)    Lyrica [Pregabalin] Unknown (comments)    Morphine Unknown (comments)    Neurontin [Gabapentin] Rash and Swelling    Pcn [Penicillins] Rash and Swelling    Sulfa (Sulfonamide Antibiotics) Rash    Tetracyclines Rash    Toradol [Ketorolac] Unknown (comments)    Tramadol Unknown (comments)         -     Physical Examination:   Blood pressure 126/56, pulse 87, temperature 96.9 °F (36.1 °C), temperature source Oral, resp. rate 14, height 5' 4\" (1.626 m), weight 156 lb 12.8 oz (71.1 kg), SpO2 95 %. Estimated body mass index is 26.91 kg/m² as calculated from the following:    Height as of this encounter: 5' 4\" (1.626 m). -   Weight as of this encounter: 156 lb 12.8 oz (71.1 kg). - General: pleasant, no distress, good eye contact  - HEENT: no pallor, no periorbital edema, EOMI  - Neck: supple,  - Cardiovascular: regular, normal rate, normal S1 and S2,   - Respiratory: clear to auscultation bilaterally  - Gastrointestinal: soft, nontender, nondistended,  BS +  - Musculoskeletal: no proximal muscle weakness in upper or lower extremities  - Integumentary: no edema,  - Neurological:,alert and oriented  - Psychiatric: normal mood and affect  - Skin: color, texture, turgor normal.     Diabetic foot exam: August 2019    Left:    Vibratory sensation could not test   Filament test decreased sensation with micro filament   Pulse DP: 1+ (weak)    Deformities: Dry skin    Right:    Vibratory sensation could not test   Filament test decreased sensation with micro filament   Pulse DP: 1+ (weak)              Deformities: Dry skin    Data Reviewed:       Assessment/Plan:     1. Type 2 diabetes mellitus with hyperglycemia, with long-term current use of insulin ([address]    2. Hypothyroidism due to acquired atrophy of thyroid    3. Essential hypertension        1. Type 2 Diabetes Mellitus with nephropathy,  Lab Results   Component Value Date/Time    Hemoglobin A1c (POC) 7.2 08/27/2019 10:58 AM     Check sugars before breakfast and before bedtime  Januvia is expensive and hence we will discontinue,  depending on the samples at PCPs office  Levemir 15 units in AM and 10 units at night .     Glucerna or premier protein when not eating  Humalog or NovoLog 3 units before dinner if she is on high carb meal.  If it is really difficult to handle two insulin or if it is expensive need to switch to Novolin 70/30 which was discussed with the family    High risk for hypoglycemia and decompensation    2. HTN : Continue current therapy     3. Hyperlipidemia : On statin. 4. Hypothyroidism - on T4    5. CKD stage III        Family member with patient         Thank you for allowing me to participate in the care of this patient.     Deanne Dong MD      Patient verbalized understanding